# Patient Record
Sex: FEMALE | Race: WHITE | NOT HISPANIC OR LATINO | URBAN - NONMETROPOLITAN AREA
[De-identification: names, ages, dates, MRNs, and addresses within clinical notes are randomized per-mention and may not be internally consistent; named-entity substitution may affect disease eponyms.]

---

## 2021-10-21 PROBLEM — I10 ESSENTIAL HYPERTENSION: Status: ACTIVE | Noted: 2021-10-21

## 2021-10-21 PROBLEM — R53.83 FATIGUE: Status: ACTIVE | Noted: 2021-10-21

## 2021-10-21 PROBLEM — R07.9 CHEST PAIN: Status: ACTIVE | Noted: 2021-10-21

## 2021-10-21 PROBLEM — R06.02 SOB (SHORTNESS OF BREATH): Status: ACTIVE | Noted: 2021-10-21

## 2021-10-21 NOTE — PROGRESS NOTES
OFFICE VISIT  NOTE  [facility] CARDIOLOGY      Name: Sharda Bowen    Date: 10/25/2021  MRN:  [id number]  :  1955      REFERRING/PRIMARY PROVIDER:  Eduardo Angelo MD    Chief Complaint   Patient presents with   • Chest Pain   • Dizziness   • Shortness of Breath       HPI: Sharda Bowen is a 66 y.o. female who presents today for new consultation for chest pressure, shortness of breath, and fatigue.  Reports chest pain, substernal, pressure-like, associated shortness of breath, last 5 to 10 minutes, worse with exertion, gets better with rest.  Had an episode when walking into Dr. Angelo's office, felt dizzy lightheaded presyncopal.  Had dyspnea on exertion when taking a shower.  Increasing fatigue and decreased exercise tolerance over the last 6 months.  Has a torn hamstring, cannot exercise.    Past Medical History:   Diagnosis Date   • Anemia    • Arthritis    • Hyperlipidemia    • Hypertension    • Perforated bowel (HCC)    • Rupture of gluteus minimus tendon        Past Surgical History:   Procedure Laterality Date   • ABDOMINAL AORTIC ANEURYSM REPAIR      Splenic   • BACK SURGERY     • CHOLECYSTECTOMY  2012   • HERNIA REPAIR  2013   • HYSTERECTOMY         Social History     Socioeconomic History   • Marital status:    Tobacco Use   • Smoking status: Never Smoker   • Smokeless tobacco: Never Used   Substance and Sexual Activity   • Alcohol use: Never   • Drug use: Never   • Sexual activity: Defer       Family History   Problem Relation Age of Onset   • Heart attack Mother         ROS:   Constitutional no fever,  no weight loss   Skin no rash, no subcutaneous nodules   Otolaryngeal no difficulty swallowing   Cardiovascular See HPI   Pulmonary no cough, no sputum production   Gastrointestinal no constipation, no diarrhea   Genitourinary no dysuria, no hematuria   Hematologic no easy bruisability, no abnormal bleeding   Musculoskeletal no muscle pain   Neurologic  "no dizziness, no falls         Allergies   Allergen Reactions   • Dexamethasone Other (See Comments)     Affected vision, fluid behind retinas per pt   • Ciprofloxacin Other (See Comments)     muscle aching, nausea, diarrhea  Several different reactions      • Metronidazole Other (See Comments)     muschle aching, nausea, diarrhea  Several different reactions           Current Outpatient Medications:   •  atorvastatin (LIPITOR) 20 MG tablet, Take 20 mg by mouth Daily., Disp: , Rfl:   •  estradiol (VIVELLE-DOT) 0.1 MG/24HR patch, Place 1 patch on the skin as directed by provider 2 (Two) Times a Week., Disp: , Rfl:   •  fexofenadine (ALLEGRA) 180 MG tablet, Take 180 mg by mouth Daily., Disp: , Rfl:   •  IPRATROPIUM BROMIDE HFA IN, Inhale 1 puff 3 (Three) Times a Day., Disp: , Rfl:   •  lisinopril (PRINIVIL,ZESTRIL) 10 MG tablet, Take 10 mg by mouth Daily., Disp: , Rfl:   •  meloxicam (MOBIC) 7.5 MG tablet, Take 7.5 mg by mouth Daily., Disp: , Rfl:   •  Multiple Vitamins-Minerals (PRESERVISION AREDS 2 PO), Take 1 capsule by mouth Daily., Disp: , Rfl:   •  multivitamin with minerals (CENTRUM ADULTS PO), Take 1 tablet by mouth Daily., Disp: , Rfl:     Vitals:    10/25/21 1103   BP: 142/82   BP Location: Right arm   Patient Position: Sitting   Pulse: 61   SpO2: 97%   Weight: 88.5 kg (195 lb)   Height: 180.3 cm (71\")     Body mass index is 27.2 kg/m².    PHYSICAL EXAM:    General Appearance:   · well developed  · well nourished  HENT:   · oropharynx moist  · lips not cyanotic  Neck:  · thyroid not enlarged  · supple  Respiratory:  · no respiratory distress  · normal breath sounds  · no rales  Cardiovascular:  · no jugular venous distention  · regular rhythm  · apical impulse normal  · S1 normal, S2 normal  · no S3, no S4   · no murmur  · no rub, no thrill  · carotid pulses normal; no bruit  · pedal pulses normal  · lower extremity edema: none    Gastrointestinal:   · bowel sounds normal  · non-tender  · no hepatomegaly, no " splenomegaly  Musculoskeletal:  · no clubbing of fingers.   · normocephalic, head atraumatic  Skin:   · warm, dry  Psychiatric:  · judgement and insight appropriate  · normal mood and affect    RESULTS:     ECG 12 Lead    Date/Time: 10/25/2021 11:20 AM  Performed by: Ramez Randolph MD  Authorized by: Ramez Randolph MD   Comparison: not compared with previous ECG   Previous ECG: no previous ECG available  Rhythm: sinus rhythm  Rate: normal  QRS axis: normal  Other findings: poor R wave progression    Clinical impression: abnormal EKG  Comments: Poor R wave progression.                  Labs:  No results found for: CHOL, TRIG, HDL, LDL, AST, ALT  No results found for: HGBA1C  No components found for: CREATINININE  No results found for: EGFRIFNONA    Most recent PCP note, imaging tests, and labs reviewed.    ASSESSMENT:  Problem List Items Addressed This Visit        Cardiac and Vasculature    Chest pain    Essential hypertension    Relevant Medications    lisinopril (PRINIVIL,ZESTRIL) 10 MG tablet       Pulmonary and Pneumonias    SOB (shortness of breath) - Primary       Symptoms and Signs    Fatigue          PLAN:    1.  Chest pain and shortness of breath:  Concern for angina, proceed with Lexiscan nuclear stress test, patient cannot exercise on a treadmill due to a torn hamstring.  Check echocardiogram to rule out structural heart disease.  The patient was advised to call 911 if chest pain worsens or persist despite rest.    2.  Hypertension:  Long-term goal blood pressure less than 130/80:  She states blood pressures been well controlled recently  Low-sodium diet recommended.  Continue lisinopril    3.  Hyperlipidemia:  Well-controlled on atorvastatin  Continue for now.    Return to clinic in 3 months, or sooner as needed.    Thank you for the opportunity to share in the care of your patient; please do not hesitate to call me with any questions.     Ramez Randolph MD, [facility]  Office: [phone]  [address] [address]    10/25/21

## 2021-10-25 ENCOUNTER — OFFICE VISIT (OUTPATIENT)
Dept: CARDIOLOGY | Facility: CLINIC | Age: 66
End: 2021-10-25

## 2021-10-25 VITALS
BODY MASS INDEX: 27.3 KG/M2 | HEIGHT: 71 IN | OXYGEN SATURATION: 97 % | WEIGHT: 195 LBS | HEART RATE: 61 BPM | SYSTOLIC BLOOD PRESSURE: 142 MMHG | DIASTOLIC BLOOD PRESSURE: 82 MMHG

## 2021-10-25 DIAGNOSIS — R53.83 OTHER FATIGUE: ICD-10-CM

## 2021-10-25 DIAGNOSIS — I10 ESSENTIAL HYPERTENSION: ICD-10-CM

## 2021-10-25 DIAGNOSIS — R07.9 CHEST PAIN, UNSPECIFIED TYPE: ICD-10-CM

## 2021-10-25 DIAGNOSIS — R06.02 SOB (SHORTNESS OF BREATH): Primary | ICD-10-CM

## 2021-10-25 PROCEDURE — 93000 ELECTROCARDIOGRAM COMPLETE: CPT | Performed by: INTERNAL MEDICINE

## 2021-10-25 PROCEDURE — 99204 OFFICE O/P NEW MOD 45 MIN: CPT | Performed by: INTERNAL MEDICINE

## 2021-10-25 RX ORDER — MULTIPLE VITAMINS W/ MINERALS TAB 9MG-400MCG
1 TAB ORAL DAILY
COMMUNITY

## 2021-10-25 RX ORDER — ESTRADIOL 0.1 MG/D
1 FILM, EXTENDED RELEASE TRANSDERMAL 2 TIMES WEEKLY
COMMUNITY
End: 2022-08-17 | Stop reason: SDUPTHER

## 2021-10-25 RX ORDER — ATORVASTATIN CALCIUM 20 MG/1
20 TABLET, FILM COATED ORAL DAILY
COMMUNITY
End: 2022-08-17 | Stop reason: SDUPTHER

## 2021-10-25 RX ORDER — MELOXICAM 7.5 MG/1
7.5 TABLET ORAL DAILY
COMMUNITY
End: 2022-08-17 | Stop reason: SDUPTHER

## 2021-10-25 RX ORDER — FEXOFENADINE HCL 180 MG/1
180 TABLET ORAL DAILY
COMMUNITY
End: 2022-08-17 | Stop reason: ALTCHOICE

## 2021-10-25 RX ORDER — LISINOPRIL 10 MG/1
10 TABLET ORAL DAILY
COMMUNITY
End: 2022-08-17 | Stop reason: SDUPTHER

## 2021-11-15 ENCOUNTER — TELEPHONE (OUTPATIENT)
Dept: CARDIOLOGY | Facility: CLINIC | Age: 66
End: 2021-11-15

## 2021-11-15 DIAGNOSIS — R07.9 CHEST PAIN, UNSPECIFIED TYPE: ICD-10-CM

## 2021-11-15 DIAGNOSIS — R53.83 OTHER FATIGUE: ICD-10-CM

## 2021-11-15 DIAGNOSIS — R06.02 SOB (SHORTNESS OF BREATH): ICD-10-CM

## 2021-11-15 DIAGNOSIS — R94.39 ABNORMAL STRESS TEST: Primary | ICD-10-CM

## 2021-11-15 NOTE — TELEPHONE ENCOUNTER
----- Message from Ramez Randolph MD sent at 11/15/2021  3:52 PM EST -----  I discussed the findings with the patient, please schedule cardiac catheterization.

## 2021-11-16 PROBLEM — R94.39 ABNORMAL STRESS TEST: Status: ACTIVE | Noted: 2021-11-16

## 2021-11-17 ENCOUNTER — PREP FOR SURGERY (OUTPATIENT)
Dept: OTHER | Facility: HOSPITAL | Age: 66
End: 2021-11-17

## 2021-11-17 RX ORDER — ASPIRIN 81 MG/1
81 TABLET ORAL DAILY
Status: CANCELLED | OUTPATIENT
Start: 2021-11-18

## 2021-11-17 RX ORDER — ASPIRIN 81 MG/1
324 TABLET, CHEWABLE ORAL ONCE
Status: CANCELLED | OUTPATIENT
Start: 2021-11-17 | End: 2021-11-17

## 2021-11-17 RX ORDER — ACETAMINOPHEN 325 MG/1
650 TABLET ORAL EVERY 4 HOURS PRN
Status: CANCELLED | OUTPATIENT
Start: 2021-11-17

## 2021-11-17 RX ORDER — NITROGLYCERIN 0.4 MG/1
0.4 TABLET SUBLINGUAL
Status: CANCELLED | OUTPATIENT
Start: 2021-11-17

## 2021-11-17 RX ORDER — SODIUM CHLORIDE 0.9 % (FLUSH) 0.9 %
10 SYRINGE (ML) INJECTION AS NEEDED
Status: CANCELLED | OUTPATIENT
Start: 2021-11-17

## 2021-11-17 RX ORDER — SODIUM CHLORIDE 0.9 % (FLUSH) 0.9 %
3 SYRINGE (ML) INJECTION EVERY 12 HOURS SCHEDULED
Status: CANCELLED | OUTPATIENT
Start: 2021-11-17

## 2021-11-24 ENCOUNTER — HOSPITAL ENCOUNTER (OUTPATIENT)
Facility: HOSPITAL | Age: 66
Setting detail: HOSPITAL OUTPATIENT SURGERY
Discharge: HOME | End: 2021-11-24
Attending: INTERNAL MEDICINE | Admitting: INTERNAL MEDICINE

## 2021-11-24 VITALS
HEART RATE: 56 BPM | DIASTOLIC BLOOD PRESSURE: 74 MMHG | SYSTOLIC BLOOD PRESSURE: 108 MMHG | OXYGEN SATURATION: 98 % | WEIGHT: 196.6 LBS | RESPIRATION RATE: 18 BRPM | HEIGHT: 71 IN | BODY MASS INDEX: 27.52 KG/M2 | TEMPERATURE: 97.8 F

## 2021-11-24 DIAGNOSIS — R07.9 CHEST PAIN, UNSPECIFIED TYPE: ICD-10-CM

## 2021-11-24 DIAGNOSIS — R94.39 ABNORMAL STRESS TEST: ICD-10-CM

## 2021-11-24 DIAGNOSIS — R53.83 OTHER FATIGUE: ICD-10-CM

## 2021-11-24 DIAGNOSIS — R06.02 SOB (SHORTNESS OF BREATH): ICD-10-CM

## 2021-11-24 LAB
ALBUMIN SERPL-MCNC: 4.4 G/DL (ref 3.5–5.2)
ALBUMIN/GLOB SERPL: 1.9 G/DL
ALP SERPL-CCNC: 61 U/L (ref 39–117)
ALT SERPL W P-5'-P-CCNC: 11 U/L (ref 1–33)
ANION GAP SERPL CALCULATED.3IONS-SCNC: 8 MMOL/L (ref 5–15)
AST SERPL-CCNC: 19 U/L (ref 1–32)
BILIRUB SERPL-MCNC: 0.4 MG/DL (ref 0–1.2)
BUN SERPL-MCNC: 18 MG/DL (ref 8–23)
BUN/CREAT SERPL: 18.9 (ref 7–25)
CALCIUM SPEC-SCNC: 8.8 MG/DL (ref 8.6–10.5)
CHLORIDE SERPL-SCNC: 105 MMOL/L (ref 98–107)
CHOLEST SERPL-MCNC: 148 MG/DL (ref 0–200)
CO2 SERPL-SCNC: 26 MMOL/L (ref 22–29)
CREAT BLDA-MCNC: 1 MG/DL (ref 0.6–1.3)
CREAT SERPL-MCNC: 0.95 MG/DL (ref 0.57–1)
DEPRECATED RDW RBC AUTO: 45.3 FL (ref 37–54)
ERYTHROCYTE [DISTWIDTH] IN BLOOD BY AUTOMATED COUNT: 13.6 % (ref 12.3–15.4)
GFR SERPL CREATININE-BSD FRML MDRD: 59 ML/MIN/1.73
GLOBULIN UR ELPH-MCNC: 2.3 GM/DL
GLUCOSE SERPL-MCNC: 92 MG/DL (ref 65–99)
HCT VFR BLD AUTO: 40.8 % (ref 34–46.6)
HDLC SERPL-MCNC: 56 MG/DL (ref 40–60)
HGB BLD-MCNC: 13.4 G/DL (ref 12–15.9)
LDLC SERPL CALC-MCNC: 66 MG/DL (ref 0–100)
LDLC/HDLC SERPL: 1.09 {RATIO}
MCH RBC QN AUTO: 29.6 PG (ref 26.6–33)
MCHC RBC AUTO-ENTMCNC: 32.8 G/DL (ref 31.5–35.7)
MCV RBC AUTO: 90.1 FL (ref 79–97)
PLATELET # BLD AUTO: 214 10*3/MM3 (ref 140–450)
PMV BLD AUTO: 9.4 FL (ref 6–12)
POTASSIUM SERPL-SCNC: 3.8 MMOL/L (ref 3.5–5.2)
PROT SERPL-MCNC: 6.7 G/DL (ref 6–8.5)
RBC # BLD AUTO: 4.53 10*6/MM3 (ref 3.77–5.28)
SODIUM SERPL-SCNC: 139 MMOL/L (ref 136–145)
TRIGL SERPL-MCNC: 155 MG/DL (ref 0–150)
VLDLC SERPL-MCNC: 26 MG/DL (ref 5–40)
WBC NRBC COR # BLD: 6.26 10*3/MM3 (ref 3.4–10.8)

## 2021-11-24 PROCEDURE — 93458 L HRT ARTERY/VENTRICLE ANGIO: CPT | Performed by: INTERNAL MEDICINE

## 2021-11-24 PROCEDURE — 80061 LIPID PANEL: CPT | Performed by: PHYSICIAN ASSISTANT

## 2021-11-24 PROCEDURE — 25010000002 MIDAZOLAM PER 1 MG: Performed by: INTERNAL MEDICINE

## 2021-11-24 PROCEDURE — 82565 ASSAY OF CREATININE: CPT

## 2021-11-24 PROCEDURE — 85027 COMPLETE CBC AUTOMATED: CPT | Performed by: PHYSICIAN ASSISTANT

## 2021-11-24 PROCEDURE — 0 IOPAMIDOL PER 1 ML: Performed by: INTERNAL MEDICINE

## 2021-11-24 PROCEDURE — 80053 COMPREHEN METABOLIC PANEL: CPT | Performed by: PHYSICIAN ASSISTANT

## 2021-11-24 PROCEDURE — C1769 GUIDE WIRE: HCPCS | Performed by: INTERNAL MEDICINE

## 2021-11-24 PROCEDURE — 25010000002 FENTANYL CITRATE (PF) 50 MCG/ML SOLUTION: Performed by: INTERNAL MEDICINE

## 2021-11-24 PROCEDURE — C1894 INTRO/SHEATH, NON-LASER: HCPCS | Performed by: INTERNAL MEDICINE

## 2021-11-24 PROCEDURE — 25010000002 HEPARIN (PORCINE) PER 1000 UNITS: Performed by: INTERNAL MEDICINE

## 2021-11-24 RX ORDER — LIDOCAINE HYDROCHLORIDE 10 MG/ML
INJECTION, SOLUTION EPIDURAL; INFILTRATION; INTRACAUDAL; PERINEURAL AS NEEDED
Status: DISCONTINUED | OUTPATIENT
Start: 2021-11-24 | End: 2021-11-24 | Stop reason: HOSPADM

## 2021-11-24 RX ORDER — SODIUM CHLORIDE 9 MG/ML
100 INJECTION, SOLUTION INTRAVENOUS CONTINUOUS
Status: DISCONTINUED | OUTPATIENT
Start: 2021-11-24 | End: 2021-11-24 | Stop reason: HOSPADM

## 2021-11-24 RX ORDER — MIDAZOLAM HYDROCHLORIDE 1 MG/ML
INJECTION INTRAMUSCULAR; INTRAVENOUS AS NEEDED
Status: DISCONTINUED | OUTPATIENT
Start: 2021-11-24 | End: 2021-11-24 | Stop reason: HOSPADM

## 2021-11-24 RX ORDER — ACETAMINOPHEN 325 MG/1
650 TABLET ORAL EVERY 4 HOURS PRN
Status: DISCONTINUED | OUTPATIENT
Start: 2021-11-24 | End: 2021-11-24 | Stop reason: HOSPADM

## 2021-11-24 RX ORDER — NITROGLYCERIN 0.4 MG/1
0.4 TABLET SUBLINGUAL
Status: DISCONTINUED | OUTPATIENT
Start: 2021-11-24 | End: 2021-11-24 | Stop reason: HOSPADM

## 2021-11-24 RX ORDER — SODIUM CHLORIDE 0.9 % (FLUSH) 0.9 %
3 SYRINGE (ML) INJECTION EVERY 12 HOURS SCHEDULED
Status: DISCONTINUED | OUTPATIENT
Start: 2021-11-24 | End: 2021-11-24 | Stop reason: HOSPADM

## 2021-11-24 RX ORDER — SODIUM CHLORIDE 9 MG/ML
3 INJECTION, SOLUTION INTRAVENOUS CONTINUOUS
Status: DISCONTINUED | OUTPATIENT
Start: 2021-11-24 | End: 2021-11-24 | Stop reason: HOSPADM

## 2021-11-24 RX ORDER — ASPIRIN 81 MG/1
324 TABLET, CHEWABLE ORAL ONCE
Status: COMPLETED | OUTPATIENT
Start: 2021-11-24 | End: 2021-11-24

## 2021-11-24 RX ORDER — SODIUM CHLORIDE 0.9 % (FLUSH) 0.9 %
10 SYRINGE (ML) INJECTION AS NEEDED
Status: DISCONTINUED | OUTPATIENT
Start: 2021-11-24 | End: 2021-11-24 | Stop reason: HOSPADM

## 2021-11-24 RX ORDER — ASPIRIN 81 MG/1
81 TABLET ORAL DAILY
Status: DISCONTINUED | OUTPATIENT
Start: 2021-11-25 | End: 2021-11-24 | Stop reason: HOSPADM

## 2021-11-24 RX ORDER — FENTANYL CITRATE 50 UG/ML
INJECTION, SOLUTION INTRAMUSCULAR; INTRAVENOUS AS NEEDED
Status: DISCONTINUED | OUTPATIENT
Start: 2021-11-24 | End: 2021-11-24 | Stop reason: HOSPADM

## 2021-11-24 RX ADMIN — ASPIRIN 324 MG: 81 TABLET, CHEWABLE ORAL at 08:17

## 2021-11-24 RX ADMIN — SODIUM CHLORIDE 3 ML/KG/HR: 9 INJECTION, SOLUTION INTRAVENOUS at 08:16

## 2021-11-24 NOTE — INTERVAL H&P NOTE
H&P reviewed. The patient was examined and there are no changes to the H&P.      The risks, benefits, and alternative options of cardiac catheterization were discussed with the patient.  The risks include death, MI, stroke, infection, vascular injury requiring surgical repair and/or blood transfusion, coronary dissection, renal dysfunction/failure, allergic reaction, emergent CABG.  If PCI is needed there is a 1-2% risk of emergent CABG.  The patient is agreeable for cardiac catheterization, possible PCI or CABG. Plan is to proceed with cardiac catheterization and possible PCI.     Ramez Randolph M.D., F.A.C.C.  Interventional Cardiology  11/24/21  08:58 EST

## 2022-04-20 RX ORDER — NORTRIPTYLINE HYDROCHLORIDE 25 MG/1
CAPSULE ORAL
Qty: 30 CAPSULE | Refills: 0 | Status: SHIPPED | OUTPATIENT
Start: 2022-04-20 | End: 2022-08-17 | Stop reason: SDUPTHER

## 2022-08-08 RX ORDER — NORTRIPTYLINE HYDROCHLORIDE 25 MG/1
CAPSULE ORAL
Qty: 90 CAPSULE | Refills: 1 | OUTPATIENT
Start: 2022-08-08

## 2022-08-17 ENCOUNTER — OFFICE VISIT (OUTPATIENT)
Dept: FAMILY MEDICINE CLINIC | Facility: CLINIC | Age: 67
End: 2022-08-17

## 2022-08-17 VITALS
BODY MASS INDEX: 26.6 KG/M2 | WEIGHT: 190 LBS | SYSTOLIC BLOOD PRESSURE: 112 MMHG | HEIGHT: 71 IN | DIASTOLIC BLOOD PRESSURE: 72 MMHG

## 2022-08-17 DIAGNOSIS — M25.561 CHRONIC PAIN OF BOTH KNEES: ICD-10-CM

## 2022-08-17 DIAGNOSIS — Z12.31 SCREENING MAMMOGRAM FOR BREAST CANCER: ICD-10-CM

## 2022-08-17 DIAGNOSIS — G89.29 CHRONIC PAIN OF BOTH KNEES: ICD-10-CM

## 2022-08-17 DIAGNOSIS — E78.2 MIXED HYPERLIPIDEMIA: ICD-10-CM

## 2022-08-17 DIAGNOSIS — K52.9 CHRONIC DIARRHEA: ICD-10-CM

## 2022-08-17 DIAGNOSIS — N95.1 POSTMENOPAUSAL SYNDROME: ICD-10-CM

## 2022-08-17 DIAGNOSIS — M25.562 CHRONIC PAIN OF BOTH KNEES: ICD-10-CM

## 2022-08-17 DIAGNOSIS — I10 ESSENTIAL HYPERTENSION: Primary | ICD-10-CM

## 2022-08-17 PROCEDURE — 1159F MED LIST DOCD IN RCRD: CPT | Performed by: PHYSICIAN ASSISTANT

## 2022-08-17 PROCEDURE — 1170F FXNL STATUS ASSESSED: CPT | Performed by: PHYSICIAN ASSISTANT

## 2022-08-17 PROCEDURE — 36415 COLL VENOUS BLD VENIPUNCTURE: CPT | Performed by: PHYSICIAN ASSISTANT

## 2022-08-17 PROCEDURE — G0402 INITIAL PREVENTIVE EXAM: HCPCS | Performed by: PHYSICIAN ASSISTANT

## 2022-08-17 RX ORDER — LISINOPRIL 10 MG/1
10 TABLET ORAL DAILY
Qty: 90 TABLET | Refills: 1 | Status: SHIPPED | OUTPATIENT
Start: 2022-08-17

## 2022-08-17 RX ORDER — ATORVASTATIN CALCIUM 20 MG/1
20 TABLET, FILM COATED ORAL DAILY
Qty: 90 TABLET | Refills: 1 | Status: SHIPPED | OUTPATIENT
Start: 2022-08-17

## 2022-08-17 RX ORDER — MELOXICAM 7.5 MG/1
7.5 TABLET ORAL DAILY
Qty: 90 TABLET | Refills: 1 | Status: SHIPPED | OUTPATIENT
Start: 2022-08-17

## 2022-08-17 RX ORDER — NORTRIPTYLINE HYDROCHLORIDE 25 MG/1
25 CAPSULE ORAL DAILY
Qty: 90 CAPSULE | Refills: 1 | Status: SHIPPED | OUTPATIENT
Start: 2022-08-17 | End: 2023-01-18

## 2022-08-17 RX ORDER — ESTRADIOL 0.1 MG/D
1 FILM, EXTENDED RELEASE TRANSDERMAL 2 TIMES WEEKLY
Qty: 48 PATCH | Refills: 1 | Status: SHIPPED | OUTPATIENT
Start: 2022-08-18

## 2022-08-17 NOTE — PROGRESS NOTES
The ABCs of the Annual Wellness Visit  Welcome to Medicare Visit    Chief Complaint   Patient presents with   • Welcome To Medicare     Patient is here today for a physical/welcome to medicare    • Hypertension     Patient is needing medication refilled   • Anxiety     Patient is needing medication refilled     Subjective {   History of Present Illness:  Sharda Bowen is a 67 y.o. female who presents for a  Welcome to Medicare Visit.    The following portions of the patient's history were reviewed and   updated as appropriate: allergies, current medications, past family history, past medical history, past social history, past surgical history and problem list.     Compared to one year ago, the patient feels her physical   health is better.    Compared to one year ago, the patient feels her mental   health is the same.    Recent Hospitalizations:  She was not admitted to the hospital during the last year.       Current Medical Providers:  Patient Care Team:  Eduardo Angelo MD as PCP - General (Family Medicine)    Outpatient Medications Prior to Visit   Medication Sig Dispense Refill   • atorvastatin (LIPITOR) 20 MG tablet Take 20 mg by mouth Daily.     • estradiol (VIVELLE-DOT) 0.1 MG/24HR patch Place 1 patch on the skin as directed by provider 2 (Two) Times a Week.     • lisinopril (PRINIVIL,ZESTRIL) 10 MG tablet Take 10 mg by mouth Daily.     • meloxicam (MOBIC) 7.5 MG tablet Take 7.5 mg by mouth Daily.     • Multiple Vitamins-Minerals (PRESERVISION AREDS 2 PO) Take 1 capsule by mouth Daily.     • multivitamin with minerals tablet tablet Take 1 tablet by mouth Daily.     • nortriptyline (PAMELOR) 25 MG capsule Take 1 capsule by mouth once daily 30 capsule 0   • fexofenadine (ALLEGRA) 180 MG tablet Take 180 mg by mouth Daily.     • IPRATROPIUM BROMIDE HFA IN Inhale 1 puff 3 (Three) Times a Day.       No facility-administered medications prior to visit.       No opioid medication identified on active  "medication list. I have reviewed chart for other potential  high risk medication/s and harmful drug interactions in the elderly.          Aspirin is not on active medication list.  Aspirin use is not indicated based on review of current medical condition/s. Risk of harm outweighs potential benefits.  .    Patient Active Problem List   Diagnosis   • Chest pain   • SOB (shortness of breath)   • Fatigue   • Essential hypertension   • Abnormal stress test   • Postmenopausal syndrome   • Chronic diarrhea   • Chronic pain of both knees   • Mixed hyperlipidemia   • Screening mammogram for breast cancer     Advance Care Planning  Advance Directive is not on file.  ACP discussion was held with the patient during this visit. Patient has an advance directive (not in EMR), copy requested.          Objective      Vitals:    08/17/22 0757   BP: 112/72   BP Location: Left arm   Patient Position: Sitting   Cuff Size: Adult   Weight: 86.2 kg (190 lb)   Height: 180.3 cm (71\")     Estimated body mass index is 26.5 kg/m² as calculated from the following:    Height as of this encounter: 180.3 cm (71\").    Weight as of this encounter: 86.2 kg (190 lb).    BMI is >= 25 and <30. (Overweight) The following options were offered after discussion;: exercise counseling/recommendations      Does the patient have evidence of cognitive impairment? No    Physical Exam         Procedures       HEALTH RISK ASSESSMENT    Smoking Status:  Social History     Tobacco Use   Smoking Status Never Smoker   Smokeless Tobacco Never Used     Alcohol Consumption:  Social History     Substance and Sexual Activity   Alcohol Use Never       Fall Risk Screen:    STEADI Fall Risk Assessment was completed, and patient is at LOW risk for falls.Assessment completed on:8/17/2022    Depression Screen:   PHQ-2/PHQ-9 Depression Screening 8/17/2022   Little Interest or Pleasure in Doing Things 0-->not at all   Feeling Down, Depressed or Hopeless 0-->not at all   PHQ-9: Brief " Depression Severity Measure Score 0       Health Habits and Functional and Cognitive Screening:  No flowsheet data found.    Visual Acuity:    No exam data present    Age-appropriate Screening Schedule:  Refer to the list below for future screening recommendations based on patient's age, sex and/or medical conditions. Orders for these recommended tests are listed in the plan section. The patient has been provided with a written plan.    Health Maintenance   Topic Date Due   • DXA SCAN  Never done   • ZOSTER VACCINE (1 of 2) 07/02/2007   • MAMMOGRAM  01/21/2021   • INFLUENZA VACCINE  10/01/2022   • LIPID PANEL  11/24/2022   • TDAP/TD VACCINES (2 - Td or Tdap) 06/05/2028          Assessment & Plan   CMS Preventative Services Quick Reference  Risk Factors Identified During Encounter  Obesity/Overweight   The above risks/problems have been discussed with the patient.  Pertinent information has been shared with the patient in the After Visit Summary.  Follow up plans and orders are seen below in the Assessment/Plan Section.    Diagnoses and all orders for this visit:    1. Essential hypertension (Primary)  -     CBC & Differential; Future  -     Comprehensive Metabolic Panel; Future  -     TSH; Future  -     Lipid Panel; Future    2. Postmenopausal syndrome    3. Chronic diarrhea    4. Chronic pain of both knees    5. Mixed hyperlipidemia    6. Screening mammogram for breast cancer        Follow Up:   No follow-ups on file.     An After Visit Summary and PPPS were made available to the patient.

## 2022-08-17 NOTE — PROGRESS NOTES
The ABCs of the Annual Wellness Visit  Welcome to Medicare Visit    Chief Complaint   Patient presents with   • Welcome To Medicare     Patient is here today for a physical/welcome to medicare    • Hypertension     Patient is needing medication refilled   • Anxiety     Patient is needing medication refilled     Subjective {   History of Present Illness:  Sharda Bowen is a 67 y.o. female who presents for a  Welcome to Medicare Visit.    The following portions of the patient's history were reviewed and   updated as appropriate: allergies, current medications, past family history, past medical history, past social history, past surgical history and problem list.     Compared to one year ago, the patient feels her physical   health is better.    Compared to one year ago, the patient feels her mental   health is the same.    Recent Hospitalizations:  She was not admitted to the hospital during the last year.       Current Medical Providers:  Patient Care Team:  Eduardo Angelo MD as PCP - General (Family Medicine)    Outpatient Medications Prior to Visit   Medication Sig Dispense Refill   • Multiple Vitamins-Minerals (PRESERVISION AREDS 2 PO) Take 1 capsule by mouth Daily.     • multivitamin with minerals tablet tablet Take 1 tablet by mouth Daily.     • atorvastatin (LIPITOR) 20 MG tablet Take 20 mg by mouth Daily.     • estradiol (VIVELLE-DOT) 0.1 MG/24HR patch Place 1 patch on the skin as directed by provider 2 (Two) Times a Week.     • lisinopril (PRINIVIL,ZESTRIL) 10 MG tablet Take 10 mg by mouth Daily.     • meloxicam (MOBIC) 7.5 MG tablet Take 7.5 mg by mouth Daily.     • nortriptyline (PAMELOR) 25 MG capsule Take 1 capsule by mouth once daily 30 capsule 0   • fexofenadine (ALLEGRA) 180 MG tablet Take 180 mg by mouth Daily.     • IPRATROPIUM BROMIDE HFA IN Inhale 1 puff 3 (Three) Times a Day.       No facility-administered medications prior to visit.       No opioid medication identified on active  "medication list. I have reviewed chart for other potential  high risk medication/s and harmful drug interactions in the elderly.          Aspirin is not on active medication list.  Aspirin use is not indicated based on review of current medical condition/s. Risk of harm outweighs potential benefits.  .    Patient Active Problem List   Diagnosis   • Chest pain   • SOB (shortness of breath)   • Fatigue   • Essential hypertension   • Abnormal stress test   • Postmenopausal syndrome   • Chronic diarrhea   • Chronic pain of both knees   • Mixed hyperlipidemia   • Screening mammogram for breast cancer     Advance Care Planning  Advance Directive is not on file.  ACP discussion was held with the patient during this visit. Patient has an advance directive (not in EMR), copy requested.          Objective      Vitals:    08/17/22 0757   BP: 112/72   BP Location: Left arm   Patient Position: Sitting   Cuff Size: Adult   Weight: 86.2 kg (190 lb)   Height: 180.3 cm (71\")     Estimated body mass index is 26.5 kg/m² as calculated from the following:    Height as of this encounter: 180.3 cm (71\").    Weight as of this encounter: 86.2 kg (190 lb).    BMI is >= 25 and <30. (Overweight) The following options were offered after discussion;: exercise counseling/recommendations      Does the patient have evidence of cognitive impairment? No    Physical Exam         Procedures       HEALTH RISK ASSESSMENT    Smoking Status:  Social History     Tobacco Use   Smoking Status Never Smoker   Smokeless Tobacco Never Used     Alcohol Consumption:  Social History     Substance and Sexual Activity   Alcohol Use Never       Fall Risk Screen:    STEADI Fall Risk Assessment was completed, and patient is at LOW risk for falls.Assessment completed on:8/17/2022    Depression Screen:   PHQ-2/PHQ-9 Depression Screening 8/17/2022   Little Interest or Pleasure in Doing Things 0-->not at all   Feeling Down, Depressed or Hopeless 0-->not at all   PHQ-9: Brief " Depression Severity Measure Score 0       Health Habits and Functional and Cognitive Screening:  Functional & Cognitive Status 8/17/2022   Do you have difficulty preparing food and eating? No   Do you have difficulty bathing yourself, getting dressed or grooming yourself? No   Do you have difficulty using the toilet? No   Do you have difficulty moving around from place to place? No   Do you have trouble with steps or getting out of a bed or a chair? No   Current Diet Well Balanced Diet   Dental Exam Up to date   Eye Exam Up to date   Exercise (times per week) 3 times per week   Current Exercises Include Walking   Do you need help using the phone?  No   Are you deaf or do you have serious difficulty hearing?  No   Do you need help with transportation? No   Do you need help shopping? No   Do you need help preparing meals?  No   Do you need help with housework?  No   Do you need help with laundry? No   Do you need help taking your medications? No   Do you need help managing money? No   Do you ever drive or ride in a car without wearing a seat belt? No   Have you felt unusual stress, anger or loneliness in the last month? No   Who do you live with? Spouse   If you need help, do you have trouble finding someone available to you? No   Have you been bothered in the last four weeks by sexual problems? No   Do you have difficulty concentrating, remembering or making decisions? No       Visual Acuity:    No exam data present    Age-appropriate Screening Schedule:  Refer to the list below for future screening recommendations based on patient's age, sex and/or medical conditions. Orders for these recommended tests are listed in the plan section. The patient has been provided with a written plan.    Health Maintenance   Topic Date Due   • DXA SCAN  Never done   • ZOSTER VACCINE (1 of 2) 07/02/2007   • MAMMOGRAM  01/21/2021   • INFLUENZA VACCINE  10/01/2022   • LIPID PANEL  11/24/2022   • TDAP/TD VACCINES (2 - Td or Tdap)  06/05/2028          Assessment & Plan   CMS Preventative Services Quick Reference  Risk Factors Identified During Encounter  Obesity/Overweight   The above risks/problems have been discussed with the patient.  Pertinent information has been shared with the patient in the After Visit Summary.  Follow up plans and orders are seen below in the Assessment/Plan Section.    Diagnoses and all orders for this visit:    1. Essential hypertension (Primary)  Assessment & Plan:  Hypertension is unchanged.  Continue current treatment regimen.  Blood pressure will be reassessed at the next regular appointment.    Orders:  -     CBC & Differential; Future  -     Comprehensive Metabolic Panel; Future  -     TSH; Future  -     Lipid Panel; Future  -     lisinopril (PRINIVIL,ZESTRIL) 10 MG tablet; Take 1 tablet by mouth Daily.  Dispense: 90 tablet; Refill: 1  -     CBC & Differential  -     Comprehensive Metabolic Panel  -     TSH  -     Lipid Panel    2. Postmenopausal syndrome  Assessment & Plan:  Continue current treatment plan with estradiol advised patient no further refills will be given until she has her mammogram she knowledge understanding    Orders:  -     estradiol (VIVELLE-DOT) 0.1 MG/24HR patch; Place 1 patch on the skin as directed by provider 2 (Two) Times a Week.  Dispense: 48 patch; Refill: 1    3. Chronic diarrhea  Assessment & Plan:  Continue current treatment plan patient is up-to-date with colon screening    Orders:  -     nortriptyline (PAMELOR) 25 MG capsule; Take 1 capsule by mouth Daily.  Dispense: 90 capsule; Refill: 1    4. Chronic pain of both knees  Assessment & Plan:  Patient will continue meloxicam    Orders:  -     meloxicam (MOBIC) 7.5 MG tablet; Take 1 tablet by mouth Daily.  Dispense: 90 tablet; Refill: 1    5. Mixed hyperlipidemia  Assessment & Plan:  Lab work performed today    Orders:  -     atorvastatin (LIPITOR) 20 MG tablet; Take 1 tablet by mouth Daily.  Dispense: 90 tablet; Refill: 1    6.  Screening mammogram for breast cancer  Assessment & Plan:  Provided patient with referral for mammogram    Orders:  -     Mammo Screening Digital Tomosynthesis Bilateral With CAD; Future      Follow Up:   No follow-ups on file.     An After Visit Summary and PPPS were made available to the patient.

## 2022-08-17 NOTE — ASSESSMENT & PLAN NOTE
Continue current treatment plan with estradiol advised patient no further refills will be given until she has her mammogram she knowledge understanding

## 2022-08-18 LAB
ALBUMIN SERPL-MCNC: 4.5 G/DL (ref 3.8–4.8)
ALBUMIN/GLOB SERPL: 2 {RATIO} (ref 1.2–2.2)
ALP SERPL-CCNC: 70 IU/L (ref 44–121)
ALT SERPL-CCNC: 13 IU/L (ref 0–32)
AST SERPL-CCNC: 25 IU/L (ref 0–40)
BASOPHILS # BLD AUTO: 0.1 X10E3/UL (ref 0–0.2)
BASOPHILS NFR BLD AUTO: 1 %
BILIRUB SERPL-MCNC: 0.3 MG/DL (ref 0–1.2)
BUN SERPL-MCNC: 22 MG/DL (ref 8–27)
BUN/CREAT SERPL: 24 (ref 12–28)
CALCIUM SERPL-MCNC: 9.2 MG/DL (ref 8.7–10.3)
CHLORIDE SERPL-SCNC: 103 MMOL/L (ref 96–106)
CHOLEST SERPL-MCNC: 173 MG/DL (ref 100–199)
CO2 SERPL-SCNC: 25 MMOL/L (ref 20–29)
CREAT SERPL-MCNC: 0.91 MG/DL (ref 0.57–1)
EGFRCR-CYS SERPLBLD CKD-EPI 2021: 70 ML/MIN/1.73
EOSINOPHIL # BLD AUTO: 0.1 X10E3/UL (ref 0–0.4)
EOSINOPHIL NFR BLD AUTO: 2 %
ERYTHROCYTE [DISTWIDTH] IN BLOOD BY AUTOMATED COUNT: 13.4 % (ref 11.7–15.4)
GLOBULIN SER CALC-MCNC: 2.3 G/DL (ref 1.5–4.5)
GLUCOSE SERPL-MCNC: 87 MG/DL (ref 65–99)
HCT VFR BLD AUTO: 42.5 % (ref 34–46.6)
HDLC SERPL-MCNC: 61 MG/DL
HGB BLD-MCNC: 14.2 G/DL (ref 11.1–15.9)
IMM GRANULOCYTES # BLD AUTO: 0 X10E3/UL (ref 0–0.1)
IMM GRANULOCYTES NFR BLD AUTO: 1 %
LDLC SERPL CALC-MCNC: 78 MG/DL (ref 0–99)
LYMPHOCYTES # BLD AUTO: 2.4 X10E3/UL (ref 0.7–3.1)
LYMPHOCYTES NFR BLD AUTO: 38 %
MCH RBC QN AUTO: 30 PG (ref 26.6–33)
MCHC RBC AUTO-ENTMCNC: 33.4 G/DL (ref 31.5–35.7)
MCV RBC AUTO: 90 FL (ref 79–97)
MONOCYTES # BLD AUTO: 0.4 X10E3/UL (ref 0.1–0.9)
MONOCYTES NFR BLD AUTO: 7 %
NEUTROPHILS # BLD AUTO: 3.4 X10E3/UL (ref 1.4–7)
NEUTROPHILS NFR BLD AUTO: 51 %
PLATELET # BLD AUTO: 230 X10E3/UL (ref 150–450)
POTASSIUM SERPL-SCNC: 4.7 MMOL/L (ref 3.5–5.2)
PROT SERPL-MCNC: 6.8 G/DL (ref 6–8.5)
RBC # BLD AUTO: 4.73 X10E6/UL (ref 3.77–5.28)
SODIUM SERPL-SCNC: 141 MMOL/L (ref 134–144)
TRIGL SERPL-MCNC: 204 MG/DL (ref 0–149)
TSH SERPL DL<=0.005 MIU/L-ACNC: 2.18 UIU/ML (ref 0.45–4.5)
VLDLC SERPL CALC-MCNC: 34 MG/DL (ref 5–40)
WBC # BLD AUTO: 6.4 X10E3/UL (ref 3.4–10.8)

## 2022-09-08 DIAGNOSIS — I10 ESSENTIAL HYPERTENSION: ICD-10-CM

## 2022-09-09 RX ORDER — LISINOPRIL 10 MG/1
TABLET ORAL
Qty: 90 TABLET | Refills: 0 | OUTPATIENT
Start: 2022-09-09

## 2022-12-07 DIAGNOSIS — I10 ESSENTIAL HYPERTENSION: ICD-10-CM

## 2022-12-07 RX ORDER — LISINOPRIL 10 MG/1
TABLET ORAL
Qty: 90 TABLET | Refills: 0 | OUTPATIENT
Start: 2022-12-07

## 2023-01-06 RX ORDER — MELOXICAM 15 MG/1
TABLET ORAL
Qty: 30 TABLET | Refills: 0 | Status: SHIPPED | OUTPATIENT
Start: 2023-01-06 | End: 2023-02-10

## 2023-01-18 DIAGNOSIS — K52.9 CHRONIC DIARRHEA: ICD-10-CM

## 2023-01-18 RX ORDER — NORTRIPTYLINE HYDROCHLORIDE 25 MG/1
CAPSULE ORAL
Qty: 30 CAPSULE | Refills: 0 | Status: SHIPPED | OUTPATIENT
Start: 2023-01-18 | End: 2023-03-03

## 2023-02-10 RX ORDER — MELOXICAM 15 MG/1
TABLET ORAL
Qty: 30 TABLET | Refills: 0 | Status: SHIPPED | OUTPATIENT
Start: 2023-02-10

## 2023-03-03 DIAGNOSIS — K52.9 CHRONIC DIARRHEA: ICD-10-CM

## 2023-03-03 RX ORDER — NORTRIPTYLINE HYDROCHLORIDE 25 MG/1
CAPSULE ORAL
Qty: 30 CAPSULE | Refills: 0 | Status: SHIPPED | OUTPATIENT
Start: 2023-03-03 | End: 2023-04-03

## 2023-04-02 DIAGNOSIS — K52.9 CHRONIC DIARRHEA: ICD-10-CM

## 2023-04-03 RX ORDER — NORTRIPTYLINE HYDROCHLORIDE 25 MG/1
CAPSULE ORAL
Qty: 30 CAPSULE | Refills: 0 | Status: SHIPPED | OUTPATIENT
Start: 2023-04-03

## 2023-04-24 ENCOUNTER — TELEPHONE (OUTPATIENT)
Dept: FAMILY MEDICINE CLINIC | Facility: CLINIC | Age: 68
End: 2023-04-24

## 2023-04-24 NOTE — TELEPHONE ENCOUNTER
Caller: Sharda Bowen    Relationship: Self    Best call back number: [phone]    Requested Prescriptions:   Requested Prescriptions      No prescriptions requested or ordered in this encounter      lisinopril (PRINIVIL,ZESTRIL) 10 MG tablet    Pharmacy where request should be sent:      Last office visit with prescribing clinician: Visit date not found   Last telemedicine visit with prescribing clinician: 4/25/2023   Next office visit with prescribing clinician: Visit date not found     Additional details provided by patient:     PLEASE CALL IN ENOUGH TO [facility] FOR TWO WEEKS BECAUSE IT WILL TAKE THAT LONG TO RECEIVE FROM [facility].  THEN CALL IN SOME TO [facility]   PATIENT DOES NOT HAVE A PILL FOR TODAYS DOSE    Does the patient have less than a 3 day supply:    [x] Yes  [] No    Would you like a call back once the refill request has been completed: [] Yes [x] No    If the office needs to give you a call back, can they leave a voicemail: [] Yes [x] No    Mandy Stevens, PCT   04/24/23 08:44 EDT

## 2023-04-25 ENCOUNTER — OFFICE VISIT (OUTPATIENT)
Dept: FAMILY MEDICINE CLINIC | Facility: CLINIC | Age: 68
End: 2023-04-25
Payer: MEDICARE

## 2023-04-25 VITALS
HEIGHT: 71 IN | DIASTOLIC BLOOD PRESSURE: 84 MMHG | HEART RATE: 50 BPM | WEIGHT: 184 LBS | SYSTOLIC BLOOD PRESSURE: 140 MMHG | OXYGEN SATURATION: 95 % | BODY MASS INDEX: 25.76 KG/M2

## 2023-04-25 DIAGNOSIS — M25.561 CHRONIC PAIN OF BOTH KNEES: ICD-10-CM

## 2023-04-25 DIAGNOSIS — I10 ESSENTIAL HYPERTENSION: ICD-10-CM

## 2023-04-25 DIAGNOSIS — G89.29 CHRONIC PAIN OF BOTH KNEES: ICD-10-CM

## 2023-04-25 DIAGNOSIS — E78.2 MIXED HYPERLIPIDEMIA: Primary | ICD-10-CM

## 2023-04-25 DIAGNOSIS — M25.562 CHRONIC PAIN OF BOTH KNEES: ICD-10-CM

## 2023-04-25 DIAGNOSIS — K52.9 CHRONIC DIARRHEA: ICD-10-CM

## 2023-04-25 DIAGNOSIS — N95.1 POSTMENOPAUSAL SYNDROME: ICD-10-CM

## 2023-04-25 DIAGNOSIS — Z23 ENCOUNTER FOR IMMUNIZATION: ICD-10-CM

## 2023-04-25 RX ORDER — LISINOPRIL 10 MG/1
10 TABLET ORAL DAILY
Qty: 90 TABLET | Refills: 1 | Status: SHIPPED | OUTPATIENT
Start: 2023-04-25 | End: 2023-04-25 | Stop reason: SDUPTHER

## 2023-04-25 RX ORDER — MELOXICAM 15 MG/1
15 TABLET ORAL DAILY
Qty: 90 TABLET | Refills: 1 | Status: SHIPPED | OUTPATIENT
Start: 2023-04-25

## 2023-04-25 RX ORDER — ESTRADIOL 0.1 MG/D
1 FILM, EXTENDED RELEASE TRANSDERMAL 2 TIMES WEEKLY
Qty: 48 PATCH | Refills: 1 | Status: SHIPPED | OUTPATIENT
Start: 2023-04-27

## 2023-04-25 RX ORDER — LIFITEGRAST 50 MG/ML
1 SOLUTION/ DROPS OPHTHALMIC 2 TIMES DAILY
COMMUNITY
Start: 2023-04-21

## 2023-04-25 RX ORDER — LISINOPRIL 10 MG/1
10 TABLET ORAL DAILY
Qty: 30 TABLET | Refills: 0 | Status: SHIPPED | OUTPATIENT
Start: 2023-04-25

## 2023-04-25 RX ORDER — NORTRIPTYLINE HYDROCHLORIDE 25 MG/1
25 CAPSULE ORAL DAILY
Qty: 90 CAPSULE | Refills: 1 | Status: SHIPPED | OUTPATIENT
Start: 2023-04-25

## 2023-04-25 NOTE — ASSESSMENT & PLAN NOTE
We will increase meloxicam from 7.5 to 15 mg.  Discussed with patient importance of increasing movement to help with arthritis

## 2023-04-25 NOTE — ASSESSMENT & PLAN NOTE
Patient blood pressure is elevated today however she has been without her medication.  Patient is medication refill today advised her to monitor and return to clinic should it remain elevated.  Patient knowledge understanding

## 2023-04-25 NOTE — PROGRESS NOTES
"Chief Complaint  Med Refill    Subjective        Sharda Bowen presents to [facility]  History of Present Illness  Patient reports today for medication refills.  Patient also states she is due for fasting blood work today.    Patient reports having high blood pressure states she has been out of her medication for the past couple of days and requests refills along with some sent to her local pharmacy.    Patient reports she has high cholesterol however discontinued her medication around 2 months ago secondary to feeling like it was causing muscle pain along with fatigue.  Patient reports feeling that her symptoms have improved.  Patient would like to get blood work today and if her cholesterol is still elevated she wants to switch from atorvastatin.    Patient reports having chronic knee pain and takes meloxicam daily.  She reports that the medication does help but she continues to have pain would like to have it increased if possible.      Objective   Vital Signs:  /84 (BP Location: Left arm, Patient Position: Sitting, Cuff Size: Adult)   Pulse 50   Ht 180.3 cm (71\")   Wt 83.5 kg (184 lb)   SpO2 95%   BMI 25.66 kg/m²   Estimated body mass index is 25.66 kg/m² as calculated from the following:    Height as of this encounter: 180.3 cm (71\").    Weight as of this encounter: 83.5 kg (184 lb).             Physical Exam  Vitals and nursing note reviewed.   Constitutional:       General: She is not in acute distress.     Appearance: Normal appearance.   HENT:      Head: Normocephalic.      Right Ear: Hearing normal.      Left Ear: Hearing normal.   Eyes:      Pupils: Pupils are equal, round, and reactive to light.   Cardiovascular:      Rate and Rhythm: Normal rate and regular rhythm.   Pulmonary:      Effort: Pulmonary effort is normal. No respiratory distress.   Skin:     General: Skin is warm and dry.   Neurological:      Mental Status: She is alert.   Psychiatric:         Mood " and Affect: Mood normal.        Result Review :                   Assessment and Plan   Diagnoses and all orders for this visit:    1. Mixed hyperlipidemia (Primary)  Assessment & Plan:  We will get fasting lipid this date advised patient if cholesterol is elevated we will change from atorvastatin to a different medication for possible tolerance.      2. Essential hypertension  Assessment & Plan:  Patient blood pressure is elevated today however she has been without her medication.  Patient is medication refill today advised her to monitor and return to clinic should it remain elevated.  Patient knowledge understanding    Orders:  -     Discontinue: lisinopril (PRINIVIL,ZESTRIL) 10 MG tablet; Take 1 tablet by mouth Daily.  Dispense: 90 tablet; Refill: 1  -     lisinopril (PRINIVIL,ZESTRIL) 10 MG tablet; Take 1 tablet by mouth Daily.  Dispense: 30 tablet; Refill: 0  -     CBC & Differential; Future  -     Comprehensive Metabolic Panel; Future  -     TSH; Future  -     Lipid Panel; Future    3. Chronic diarrhea  Assessment & Plan:  Continue current treatment plan    Orders:  -     nortriptyline (PAMELOR) 25 MG capsule; Take 1 capsule by mouth Daily.  Dispense: 90 capsule; Refill: 1    4. Postmenopausal syndrome  Assessment & Plan:  Patient will continue current treatment plan    Orders:  -     estradiol (VIVELLE-DOT) 0.1 MG/24HR patch; Place 1 patch on the skin as directed by provider 2 (Two) Times a Week.  Dispense: 48 patch; Refill: 1    5. Chronic pain of both knees  Assessment & Plan:  We will increase meloxicam from 7.5 to 15 mg.  Discussed with patient importance of increasing movement to help with arthritis    Orders:  -     meloxicam (MOBIC) 15 MG tablet; Take 1 tablet by mouth Daily. For joint pain  Dispense: 90 tablet; Refill: 1    6. Encounter for immunization  Assessment & Plan:  Patient provided with Pneumovax 20 this date    Orders:  -     Pneumococcal Conjugate Vaccine 20-Valent (PCV20)           Follow  Up   Return in about 6 months (around 10/25/2023).  Patient was given instructions and counseling regarding her condition or for health maintenance advice. Please see specific information pulled into the AVS if appropriate.

## 2023-04-25 NOTE — ASSESSMENT & PLAN NOTE
We will get fasting lipid this date advised patient if cholesterol is elevated we will change from atorvastatin to a different medication for possible tolerance.

## 2023-04-26 LAB
ALBUMIN SERPL-MCNC: 4.2 G/DL (ref 3.8–4.8)
ALBUMIN/GLOB SERPL: 1.8 {RATIO} (ref 1.2–2.2)
ALP SERPL-CCNC: 71 IU/L (ref 44–121)
ALT SERPL-CCNC: 10 IU/L (ref 0–32)
AST SERPL-CCNC: 20 IU/L (ref 0–40)
BASOPHILS # BLD AUTO: 0.1 X10E3/UL (ref 0–0.2)
BASOPHILS NFR BLD AUTO: 1 %
BILIRUB SERPL-MCNC: 0.4 MG/DL (ref 0–1.2)
BUN SERPL-MCNC: 17 MG/DL (ref 8–27)
BUN/CREAT SERPL: 20 (ref 12–28)
CALCIUM SERPL-MCNC: 9 MG/DL (ref 8.7–10.3)
CHLORIDE SERPL-SCNC: 104 MMOL/L (ref 96–106)
CHOLEST SERPL-MCNC: 255 MG/DL (ref 100–199)
CO2 SERPL-SCNC: 25 MMOL/L (ref 20–29)
CREAT SERPL-MCNC: 0.86 MG/DL (ref 0.57–1)
EGFRCR SERPLBLD CKD-EPI 2021: 75 ML/MIN/1.73
EOSINOPHIL # BLD AUTO: 0.1 X10E3/UL (ref 0–0.4)
EOSINOPHIL NFR BLD AUTO: 2 %
ERYTHROCYTE [DISTWIDTH] IN BLOOD BY AUTOMATED COUNT: 13.1 % (ref 11.7–15.4)
GLOBULIN SER CALC-MCNC: 2.4 G/DL (ref 1.5–4.5)
GLUCOSE SERPL-MCNC: 80 MG/DL (ref 70–99)
HCT VFR BLD AUTO: 42.8 % (ref 34–46.6)
HDLC SERPL-MCNC: 65 MG/DL
HGB BLD-MCNC: 13.8 G/DL (ref 11.1–15.9)
IMM GRANULOCYTES # BLD AUTO: 0 X10E3/UL (ref 0–0.1)
IMM GRANULOCYTES NFR BLD AUTO: 1 %
LDLC SERPL CALC-MCNC: 156 MG/DL (ref 0–99)
LYMPHOCYTES # BLD AUTO: 2.2 X10E3/UL (ref 0.7–3.1)
LYMPHOCYTES NFR BLD AUTO: 37 %
MCH RBC QN AUTO: 29 PG (ref 26.6–33)
MCHC RBC AUTO-ENTMCNC: 32.2 G/DL (ref 31.5–35.7)
MCV RBC AUTO: 90 FL (ref 79–97)
MONOCYTES # BLD AUTO: 0.4 X10E3/UL (ref 0.1–0.9)
MONOCYTES NFR BLD AUTO: 6 %
NEUTROPHILS # BLD AUTO: 3.2 X10E3/UL (ref 1.4–7)
NEUTROPHILS NFR BLD AUTO: 53 %
PLATELET # BLD AUTO: 247 X10E3/UL (ref 150–450)
POTASSIUM SERPL-SCNC: 5.1 MMOL/L (ref 3.5–5.2)
PROT SERPL-MCNC: 6.6 G/DL (ref 6–8.5)
RBC # BLD AUTO: 4.76 X10E6/UL (ref 3.77–5.28)
SODIUM SERPL-SCNC: 141 MMOL/L (ref 134–144)
TRIGL SERPL-MCNC: 188 MG/DL (ref 0–149)
TSH SERPL DL<=0.005 MIU/L-ACNC: 1.74 UIU/ML (ref 0.45–4.5)
VLDLC SERPL CALC-MCNC: 34 MG/DL (ref 5–40)
WBC # BLD AUTO: 5.9 X10E3/UL (ref 3.4–10.8)

## 2023-05-01 DIAGNOSIS — E78.2 MIXED HYPERLIPIDEMIA: Primary | ICD-10-CM

## 2023-05-01 RX ORDER — PRAVASTATIN SODIUM 20 MG
20 TABLET ORAL DAILY
Qty: 90 TABLET | Refills: 1 | Status: SHIPPED | OUTPATIENT
Start: 2023-05-01

## 2023-10-25 ENCOUNTER — OFFICE VISIT (OUTPATIENT)
Dept: FAMILY MEDICINE CLINIC | Facility: CLINIC | Age: 68
End: 2023-10-25
Payer: MEDICARE

## 2023-10-25 VITALS
WEIGHT: 181 LBS | TEMPERATURE: 97.6 F | BODY MASS INDEX: 25.34 KG/M2 | OXYGEN SATURATION: 99 % | SYSTOLIC BLOOD PRESSURE: 126 MMHG | HEART RATE: 76 BPM | DIASTOLIC BLOOD PRESSURE: 82 MMHG | HEIGHT: 71 IN

## 2023-10-25 DIAGNOSIS — I10 ESSENTIAL HYPERTENSION: ICD-10-CM

## 2023-10-25 DIAGNOSIS — R53.82 CHRONIC FATIGUE: ICD-10-CM

## 2023-10-25 DIAGNOSIS — Z23 NEEDS FLU SHOT: ICD-10-CM

## 2023-10-25 DIAGNOSIS — Z12.11 SCREENING FOR COLON CANCER: ICD-10-CM

## 2023-10-25 DIAGNOSIS — E55.9 VITAMIN D DEFICIENCY: ICD-10-CM

## 2023-10-25 DIAGNOSIS — K52.9 CHRONIC DIARRHEA: ICD-10-CM

## 2023-10-25 DIAGNOSIS — Z12.31 SCREENING MAMMOGRAM FOR BREAST CANCER: ICD-10-CM

## 2023-10-25 DIAGNOSIS — Z86.010 PERSONAL HISTORY OF COLONIC POLYPS: ICD-10-CM

## 2023-10-25 DIAGNOSIS — Z11.59 NEED FOR HEPATITIS C SCREENING TEST: ICD-10-CM

## 2023-10-25 DIAGNOSIS — Z13.820 ENCOUNTER FOR OSTEOPOROSIS SCREENING IN ASYMPTOMATIC POSTMENOPAUSAL PATIENT: ICD-10-CM

## 2023-10-25 DIAGNOSIS — E78.2 MIXED HYPERLIPIDEMIA: ICD-10-CM

## 2023-10-25 DIAGNOSIS — Z00.00 GENERAL MEDICAL EXAM: Primary | ICD-10-CM

## 2023-10-25 DIAGNOSIS — Z78.0 ENCOUNTER FOR OSTEOPOROSIS SCREENING IN ASYMPTOMATIC POSTMENOPAUSAL PATIENT: ICD-10-CM

## 2023-10-25 DIAGNOSIS — E53.8 VITAMIN B12 DEFICIENCY: ICD-10-CM

## 2023-10-25 DIAGNOSIS — E66.3 OVERWEIGHT (BMI 25.0-29.9): ICD-10-CM

## 2023-10-25 DIAGNOSIS — Z79.899 HIGH RISK MEDICATION USE: ICD-10-CM

## 2023-10-25 DIAGNOSIS — N95.1 POSTMENOPAUSAL SYNDROME: ICD-10-CM

## 2023-10-25 PROBLEM — Z86.0100 PERSONAL HISTORY OF COLONIC POLYPS: Status: ACTIVE | Noted: 2023-10-25

## 2023-10-25 PROBLEM — R06.02 SOB (SHORTNESS OF BREATH): Status: RESOLVED | Noted: 2021-10-21 | Resolved: 2023-10-25

## 2023-10-25 PROBLEM — M25.562 CHRONIC PAIN OF BOTH KNEES: Status: RESOLVED | Noted: 2022-08-17 | Resolved: 2023-10-25

## 2023-10-25 PROBLEM — G89.29 CHRONIC PAIN OF BOTH KNEES: Status: RESOLVED | Noted: 2022-08-17 | Resolved: 2023-10-25

## 2023-10-25 PROBLEM — R53.83 FATIGUE: Status: RESOLVED | Noted: 2021-10-21 | Resolved: 2023-10-25

## 2023-10-25 PROBLEM — M15.9 GENERALIZED OSTEOARTHRITIS OF MULTIPLE SITES: Status: ACTIVE | Noted: 2023-10-25

## 2023-10-25 PROBLEM — M15.9 GENERALIZED OSTEOARTHRITIS OF MULTIPLE SITES: Status: RESOLVED | Noted: 2023-10-25 | Resolved: 2023-10-25

## 2023-10-25 PROBLEM — R94.39 ABNORMAL STRESS TEST: Status: RESOLVED | Noted: 2021-11-16 | Resolved: 2023-10-25

## 2023-10-25 PROBLEM — M25.561 CHRONIC PAIN OF BOTH KNEES: Status: RESOLVED | Noted: 2022-08-17 | Resolved: 2023-10-25

## 2023-10-25 PROBLEM — R07.9 CHEST PAIN: Status: RESOLVED | Noted: 2021-10-21 | Resolved: 2023-10-25

## 2023-10-25 RX ORDER — ESTRADIOL 0.1 MG/D
1 FILM, EXTENDED RELEASE TRANSDERMAL 2 TIMES WEEKLY
Qty: 24 PATCH | Refills: 1 | Status: SHIPPED | OUTPATIENT
Start: 2023-10-26

## 2023-10-25 RX ORDER — PRAVASTATIN SODIUM 20 MG
20 TABLET ORAL DAILY
Qty: 90 TABLET | Refills: 1 | Status: SHIPPED | OUTPATIENT
Start: 2023-10-25

## 2023-10-25 RX ORDER — NORTRIPTYLINE HYDROCHLORIDE 25 MG/1
25 CAPSULE ORAL DAILY
Qty: 90 CAPSULE | Refills: 1 | Status: SHIPPED | OUTPATIENT
Start: 2023-10-25

## 2023-10-25 RX ORDER — LISINOPRIL 10 MG/1
10 TABLET ORAL DAILY
Qty: 90 TABLET | Refills: 1 | Status: SHIPPED | OUTPATIENT
Start: 2023-10-25

## 2023-10-25 NOTE — ASSESSMENT & PLAN NOTE
Patient's (Body mass index is 25.24 kg/m².) indicates that they are overweight with health conditions that include hypertension, dyslipidemias, and osteoarthritis . Weight is unchanged. BMI is is above average; BMI management plan is completed. We discussed portion control and increasing exercise.

## 2023-10-25 NOTE — PROGRESS NOTES
QUICK REFERENCE INFORMATION:  The ABCs of the Annual Wellness Visit    Subsequent Medicare Wellness Visit    @[email or web address]@    HEALTH RISK ASSESSMENT    1955    Recent Hospitalizations:  No hospitalization(s) within the last year..        Current Medical Providers:  Patient Care Team:  Eduardo Angelo MD as PCP - General (Family Medicine)        Smoking Status:  Social History     Tobacco Use   Smoking Status Never   Smokeless Tobacco Never       Alcohol Consumption:  Social History     Substance and Sexual Activity   Alcohol Use Not Currently       Depression Screen:       4/25/2023    10:47 AM   PHQ-2/PHQ-9 Depression Screening   Little Interest or Pleasure in Doing Things 0-->not at all   Feeling Down, Depressed or Hopeless 0-->not at all   PHQ-9: Brief Depression Severity Measure Score 0       Health Habits and Functional and Cognitive Screening:      10/25/2023     9:00 AM   Functional & Cognitive Status   Do you have difficulty preparing food and eating? No   Do you have difficulty bathing yourself, getting dressed or grooming yourself? No   Do you have difficulty using the toilet? No   Do you have difficulty moving around from place to place? No   Do you have trouble with steps or getting out of a bed or a chair? No   Current Diet Well Balanced Diet   Dental Exam Up to date   Eye Exam Up to date   Exercise (times per week) 4 times per week   Current Exercises Include [employer]   Do you need help using the phone?  No   Are you deaf or do you have serious difficulty hearing?  No   Do you need help to go to places out of walking distance? No   Do you need help shopping? No   Do you need help preparing meals?  No   Do you need help with housework?  No   Do you need help with laundry? No   Do you need help taking your medications? No   Do you need help managing money? No   Do you ever drive or ride in a car without wearing a seat belt? No   Have you felt unusual stress, anger or loneliness in the last month?  "No   Who do you live with? Spouse   If you need help, do you have trouble finding someone available to you? No   Have you been bothered in the last four weeks by sexual problems? No   Do you have difficulty concentrating, remembering or making decisions? No       Fall Risk Screen:  ELIZABET Fall Risk Assessment was completed, and patient is at LOW risk for falls.Assessment completed on:4/25/2023    ACE III MINI        Does the patient have evidence of cognitive impairment? No    Aspirin use counseling: Does not need ASA (and currently is not on it)    Recent Lab Results:  CMP:  Lab Results   Component Value Date    BUN 17 04/25/2023    CREATININE 0.86 04/25/2023    EGFRIFNONA 59 (L) 11/24/2021    BCR 20 04/25/2023     04/25/2023    K 5.1 04/25/2023    CO2 25 04/25/2023    CALCIUM 9.0 04/25/2023    PROTENTOTREF 6.6 04/25/2023    ALBUMIN 4.2 04/25/2023    LABGLOBREF 2.4 04/25/2023    LABIL2 1.8 04/25/2023    BILITOT 0.4 04/25/2023    ALKPHOS 71 04/25/2023    AST 20 04/25/2023    ALT 10 04/25/2023     HbA1c:  No results found for: \"HGBA1C\"  Microalbumin:  No results found for: \"MICROALBUR\", \"POCMALB\", \"POCCREAT\"  Lipid Panel  Lab Results   Component Value Date    CHOL 148 11/24/2021    TRIG 188 (H) 04/25/2023    HDL 65 04/25/2023     (H) 04/25/2023    AST 20 04/25/2023    ALT 10 04/25/2023       Visual Acuity:  No results found.    Age-appropriate Screening Schedule:  Refer to the list below for future screening recommendations based on patient's age, sex and/or medical conditions. Orders for these recommended tests are listed in the plan section. The patient has been provided with a written plan.    Health Maintenance   Topic Date Due    DXA SCAN  Never done    MAMMOGRAM  01/21/2021    HEPATITIS C SCREENING  Never done    INFLUENZA VACCINE  08/01/2023    COVID-19 Vaccine (7 - 2023-24 season) 09/03/2023    ZOSTER VACCINE (1 of 2) 10/25/2024 (Originally 7/2/2007)    COLORECTAL CANCER SCREENING  01/16/2024    " LIPID PANEL  04/25/2024    ANNUAL WELLNESS VISIT  10/25/2024    BMI FOLLOWUP  10/25/2024    TDAP/TD VACCINES (2 - Td or Tdap) 06/05/2028    Pneumococcal Vaccine 65+  Completed        Subjective   History of Present Illness    Sharda Bowen is a 68 y.o. female who presents for a Subsequent Wellness Visit and to establish care with me.    On estradiol for menopausal symptoms - due for mammogram.  We discussed the importance of this today.  Mammogram ordered and she understands we cannot continue with hormone treatment unless this gets completed.  We discussed the risk for blood clots, strokes, heart disease and breast cancer with hormone supplementation.    Due for DEXA and discussed today.  Ordered DEXA    On nortriptyline for chronic diarrhea/ibs    Colonoscopy 1/2019 with dr villegas and she did have polyps again on this followup colonoscopy with plan for repeat in 5 yrs - discussed she is due Jan 2024.  Declines colonscopy given problems with bowels after last testing.  Would like cologuard    On pravachol for hyperlipidemia and lisinopril for HTN    Discussed HepC screening today - Willing for cologuard    Discussed vaccinations today - Shingrix, flu, and covid19 booster. Had shingrix at pharmacy and will get us dates.  Plans for covid booster.  Ready for flu shot today    Problems with fatigue and discussed reducing nortriptyline.  She will consider but wants vit levels checked with labs.  Wants to wait on carlota workup     CHRONIC CONDITIONS    The following portions of the patient's history were reviewed and updated as appropriate: allergies, current medications, past family history, past medical history, past social history, past surgical history, and problem list.    Outpatient Medications Prior to Visit   Medication Sig Dispense Refill    Multiple Vitamins-Minerals (PRESERVISION AREDS 2 PO) Take 1 capsule by mouth Daily.      multivitamin with minerals tablet tablet Take 1 tablet by mouth Daily.      Xiidra 5 %  ophthalmic solution Apply 1 drop to eye(s) as directed by provider 2 (Two) Times a Day.      estradiol (VIVELLE-DOT) 0.1 MG/24HR patch Place 1 patch on the skin as directed by provider 2 (Two) Times a Week. 48 patch 1    lisinopril (PRINIVIL,ZESTRIL) 10 MG tablet Take 1 tablet by mouth Daily. 30 tablet 0    nortriptyline (PAMELOR) 25 MG capsule Take 1 capsule by mouth Daily. 90 capsule 1    pravastatin (PRAVACHOL) 20 MG tablet Take 1 tablet by mouth Daily. For cholesterol 90 tablet 1    meloxicam (MOBIC) 15 MG tablet Take 1 tablet by mouth Daily. For joint pain (Patient not taking: Reported on 10/25/2023) 90 tablet 1     No facility-administered medications prior to visit.       Patient Active Problem List   Diagnosis    Essential hypertension    Postmenopausal syndrome    Chronic diarrhea    Mixed hyperlipidemia    Screening mammogram for breast cancer    General medical exam    High risk medication use    Encounter for osteoporosis screening in asymptomatic postmenopausal patient    Personal history of colonic polyps    Overweight (BMI 25.0-29.9)    Need for hepatitis C screening test    Chronic fatigue       Advance Care Planning:  ACP discussion was held with the patient during this visit. Patient does not have an advance directive, information provided.    Identification of Risk Factors:  Risk factors include: Advance Directive Discussion  Breast Cancer/Mammogram Screening  Colon Cancer Screening  Immunizations Discussed/Encouraged (specific immunizations; COVID19 )  Osteoporosis Risk.    Review of Systems   Constitutional:  Positive for fatigue. Negative for appetite change, chills, fever and unexpected weight change.   HENT:  Negative for hearing loss.    Eyes:  Negative for visual disturbance.   Respiratory:  Negative for chest tightness, shortness of breath and wheezing.    Cardiovascular:  Negative for chest pain, palpitations and leg swelling.   Gastrointestinal:  Negative for abdominal pain.  "  Musculoskeletal:  Negative for arthralgias, back pain and gait problem.   Skin:  Negative for rash.   Neurological:  Negative for dizziness and headaches.   Psychiatric/Behavioral:  Negative for agitation and confusion. The patient is not nervous/anxious.        Compared to one year ago, the patient feels her physical health is the same.  Compared to one year ago, the patient feels her mental health is the same.    Objective     Physical Exam  Constitutional:       Appearance: Normal appearance.   HENT:      Head: Normocephalic.      Right Ear: External ear normal.      Left Ear: External ear normal.      Nose: Nose normal.   Eyes:      Pupils: Pupils are equal, round, and reactive to light.   Cardiovascular:      Rate and Rhythm: Normal rate and regular rhythm.      Heart sounds: Normal heart sounds.   Pulmonary:      Effort: Pulmonary effort is normal.      Breath sounds: Normal breath sounds.   Musculoskeletal:         General: Normal range of motion.      Cervical back: Normal range of motion.   Skin:     General: Skin is warm and dry.   Neurological:      General: No focal deficit present.      Mental Status: She is alert.   Psychiatric:         Mood and Affect: Mood normal.         Behavior: Behavior normal.         Thought Content: Thought content normal.          Procedures     Vitals:    10/25/23 0852   BP: 126/82   Pulse: 76   Temp: 97.6 °F (36.4 °C)   TempSrc: Infrared   SpO2: 99%   Weight: 82.1 kg (181 lb)   Height: 180.3 cm (71\")       BMI is >= 25 and <30. (Overweight) The following options were offered after discussion;: weight loss educational material (shared in after visit summary) and exercise counseling/recommendations      Lab Results   Component Value Date    WBC 5.9 04/25/2023    HGB 13.8 04/25/2023    HCT 42.8 04/25/2023    MCV 90 04/25/2023     04/25/2023     Lab Results   Component Value Date    GLUCOSE 80 04/25/2023    BUN 17 04/25/2023    CREATININE 0.86 04/25/2023    EGFRIFNONA " "59 (L) 11/24/2021    BCR 20 04/25/2023    K 5.1 04/25/2023    CO2 25 04/25/2023    CALCIUM 9.0 04/25/2023    PROTENTOTREF 6.6 04/25/2023    ALBUMIN 4.2 04/25/2023    LABIL2 1.8 04/25/2023    AST 20 04/25/2023    ALT 10 04/25/2023     Lab Results   Component Value Date    TSH 1.740 04/25/2023     No results found for: \"PSA\"  Lab Results   Component Value Date    CHOL 148 11/24/2021    CHLPL 255 (H) 04/25/2023    TRIG 188 (H) 04/25/2023    HDL 65 04/25/2023     (H) 04/25/2023       Assessment & Plan   Problem List Items Addressed This Visit       Essential hypertension (Chronic)    Relevant Medications    lisinopril (PRINIVIL,ZESTRIL) 10 MG tablet    Other Relevant Orders    CBC Auto Differential    Comprehensive Metabolic Panel    Lipid Panel    TSH    T4, Free    Postmenopausal syndrome (Chronic)    Relevant Medications    estradiol (VIVELLE-DOT) 0.1 MG/24HR patch (Start on 10/26/2023)    Chronic diarrhea (Chronic)    Relevant Medications    nortriptyline (PAMELOR) 25 MG capsule    Mixed hyperlipidemia (Chronic)    Relevant Medications    pravastatin (PRAVACHOL) 20 MG tablet    Other Relevant Orders    CBC Auto Differential    Comprehensive Metabolic Panel    Lipid Panel    TSH    T4, Free    Screening mammogram for breast cancer    Relevant Orders    Mammo Screening Bilateral With CAD    General medical exam - Primary    Current Assessment & Plan     Discussed together health maintenance and screening along with vaccination options and healthy diet and exercise habits as part of the preventative counseling at their physical exam today.     Updated with flu shot    Awaiting labs    Encouraged covid booster    She will get us dates for shingrix    Scheduling mammogram and dexa    Declines colonoscopy in Jan.  Rasheed ordered          High risk medication use (Chronic)    Encounter for osteoporosis screening in asymptomatic postmenopausal patient    Relevant Orders    DEXA Bone Density Axial    Personal " history of colonic polyps    Current Assessment & Plan     Last colonoscopy Jan 2019.  Given polyps she is due for repeat in Jan 2024.  Discussed today -          Overweight (BMI 25.0-29.9)    Current Assessment & Plan     Patient's (Body mass index is 25.24 kg/m².) indicates that they are overweight with health conditions that include hypertension, dyslipidemias, and osteoarthritis . Weight is unchanged. BMI is is above average; BMI management plan is completed. We discussed portion control and increasing exercise.          Need for hepatitis C screening test    Relevant Orders    HCV Antibody Rfx To Qnt PCR    Chronic fatigue     Other Visit Diagnoses       Needs flu shot        Relevant Orders    Fluzone High-Dose 65+yrs ([id number])    Screening for colon cancer        Relevant Orders    Cologuard - Stool, Per Rectum    Vitamin B12 deficiency        Relevant Orders    CBC Auto Differential    Vitamin B12    Vitamin D deficiency        Relevant Orders    Vitamin D,25-Hydroxy          Patient Self-Management and Personalized Health Advice  The patient has been provided with information about: diet, exercise, and weight management and preventive services including:   Annual Wellness Visit (AWV)  Bone Density Measurements  Influenza Vaccine and Administration  Screening Mammography .    Outpatient Encounter Medications as of 10/25/2023   Medication Sig Dispense Refill    [START ON 10/26/2023] estradiol (VIVELLE-DOT) 0.1 MG/24HR patch Place 1 patch on the skin as directed by provider 2 (Two) Times a Week. 24 patch 1    lisinopril (PRINIVIL,ZESTRIL) 10 MG tablet Take 1 tablet by mouth Daily. 90 tablet 1    Multiple Vitamins-Minerals (PRESERVISION AREDS 2 PO) Take 1 capsule by mouth Daily.      multivitamin with minerals tablet tablet Take 1 tablet by mouth Daily.      nortriptyline (PAMELOR) 25 MG capsule Take 1 capsule by mouth Daily. 90 capsule 1    pravastatin (PRAVACHOL) 20 MG tablet Take 1 tablet by mouth Daily.  For cholesterol 90 tablet 1    Xiidra 5 % ophthalmic solution Apply 1 drop to eye(s) as directed by provider 2 (Two) Times a Day.      [DISCONTINUED] estradiol (VIVELLE-DOT) 0.1 MG/24HR patch Place 1 patch on the skin as directed by provider 2 (Two) Times a Week. 48 patch 1    [DISCONTINUED] lisinopril (PRINIVIL,ZESTRIL) 10 MG tablet Take 1 tablet by mouth Daily. 30 tablet 0    [DISCONTINUED] nortriptyline (PAMELOR) 25 MG capsule Take 1 capsule by mouth Daily. 90 capsule 1    [DISCONTINUED] pravastatin (PRAVACHOL) 20 MG tablet Take 1 tablet by mouth Daily. For cholesterol 90 tablet 1    [DISCONTINUED] meloxicam (MOBIC) 15 MG tablet Take 1 tablet by mouth Daily. For joint pain (Patient not taking: Reported on 10/25/2023) 90 tablet 1     No facility-administered encounter medications on file as of 10/25/2023.       Reviewed use of high risk medication in the elderly: yes  Reviewed for potential of harmful drug interactions in the elderly: yes    Diagnoses and all orders for this visit:    1. General medical exam (Primary)  Assessment & Plan:  Discussed together health maintenance and screening along with vaccination options and healthy diet and exercise habits as part of the preventative counseling at their physical exam today.     Updated with flu shot    Awaiting labs    Encouraged covid booster    She will get us dates for shingrix    Scheduling mammogram and dexa    Declines colonoscopy in Jan.  Cologloyd ordered       2. Postmenopausal syndrome  -     estradiol (VIVELLE-DOT) 0.1 MG/24HR patch; Place 1 patch on the skin as directed by provider 2 (Two) Times a Week.  Dispense: 24 patch; Refill: 1    3. High risk medication use    4. Screening mammogram for breast cancer  -     Mammo Screening Bilateral With CAD; Future    5. Essential hypertension  -     lisinopril (PRINIVIL,ZESTRIL) 10 MG tablet; Take 1 tablet by mouth Daily.  Dispense: 90 tablet; Refill: 1  -     CBC Auto Differential; Future  -     Comprehensive  Metabolic Panel; Future  -     Lipid Panel; Future  -     TSH; Future  -     T4, Free; Future  -     CBC Auto Differential  -     Comprehensive Metabolic Panel  -     Lipid Panel  -     TSH  -     T4, Free    6. Mixed hyperlipidemia  -     pravastatin (PRAVACHOL) 20 MG tablet; Take 1 tablet by mouth Daily. For cholesterol  Dispense: 90 tablet; Refill: 1  -     CBC Auto Differential; Future  -     Comprehensive Metabolic Panel; Future  -     Lipid Panel; Future  -     TSH; Future  -     T4, Free; Future  -     CBC Auto Differential  -     Comprehensive Metabolic Panel  -     Lipid Panel  -     TSH  -     T4, Free    7. Chronic diarrhea  -     nortriptyline (PAMELOR) 25 MG capsule; Take 1 capsule by mouth Daily.  Dispense: 90 capsule; Refill: 1    8. Encounter for osteoporosis screening in asymptomatic postmenopausal patient  -     DEXA Bone Density Axial; Future    9. Personal history of colonic polyps  Assessment & Plan:  Last colonoscopy Jan 2019.  Given polyps she is due for repeat in Jan 2024.  Discussed today -       10. Needs flu shot  -     Fluzone High-Dose 65+yrs ([id number])    11. Screening for colon cancer  -     Cologuard - Stool, Per Rectum; Future    12. Overweight (BMI 25.0-29.9)  Assessment & Plan:  Patient's (Body mass index is 25.24 kg/m².) indicates that they are overweight with health conditions that include hypertension, dyslipidemias, and osteoarthritis . Weight is unchanged. BMI is is above average; BMI management plan is completed. We discussed portion control and increasing exercise.       13. Need for hepatitis C screening test  -     HCV Antibody Rfx To Qnt PCR; Future  -     HCV Antibody Rfx To Qnt PCR    14. Vitamin B12 deficiency  -     CBC Auto Differential; Future  -     Vitamin B12; Future  -     CBC Auto Differential  -     Vitamin B12    15. Vitamin D deficiency  -     Vitamin D,25-Hydroxy; Future  -     Vitamin D,25-Hydroxy    16. Chronic fatigue          Follow Up:  Return in  about 6 months (around 4/25/2024) for Med recheck, Fasting for labs at appointment (but drink water!).     There are no Patient Instructions on file for this visit.    An After Visit Summary and PPPS with all of these plans were given to the patient.         Answers submitted by the patient for this visit:  Primary Reason for Visit (Submitted on 10/19/2023)  What is the primary reason for your visit?: Physical

## 2023-10-25 NOTE — ASSESSMENT & PLAN NOTE
Discussed together health maintenance and screening along with vaccination options and healthy diet and exercise habits as part of the preventative counseling at their physical exam today.     Updated with flu shot    Awaiting labs    Encouraged covid booster    She will get us dates for shingrix    Scheduling mammogram and dexa    Declines colonoscopy in Jan.  Rasheed ordered

## 2023-10-26 LAB
25(OH)D3+25(OH)D2 SERPL-MCNC: 49.8 NG/ML (ref 30–100)
ALBUMIN SERPL-MCNC: 4.4 G/DL (ref 3.9–4.9)
ALBUMIN/GLOB SERPL: 2 {RATIO} (ref 1.2–2.2)
ALP SERPL-CCNC: 66 IU/L (ref 44–121)
ALT SERPL-CCNC: 12 IU/L (ref 0–32)
AST SERPL-CCNC: 24 IU/L (ref 0–40)
BASOPHILS # BLD AUTO: 0.1 X10E3/UL (ref 0–0.2)
BASOPHILS NFR BLD AUTO: 1 %
BILIRUB SERPL-MCNC: 0.3 MG/DL (ref 0–1.2)
BUN SERPL-MCNC: 17 MG/DL (ref 8–27)
BUN/CREAT SERPL: 16 (ref 12–28)
CALCIUM SERPL-MCNC: 8.9 MG/DL (ref 8.7–10.3)
CHLORIDE SERPL-SCNC: 104 MMOL/L (ref 96–106)
CHOLEST SERPL-MCNC: 206 MG/DL (ref 100–199)
CO2 SERPL-SCNC: 24 MMOL/L (ref 20–29)
CREAT SERPL-MCNC: 1.08 MG/DL (ref 0.57–1)
EGFRCR SERPLBLD CKD-EPI 2021: 57 ML/MIN/1.73
EOSINOPHIL # BLD AUTO: 0.1 X10E3/UL (ref 0–0.4)
EOSINOPHIL NFR BLD AUTO: 1 %
ERYTHROCYTE [DISTWIDTH] IN BLOOD BY AUTOMATED COUNT: 13.5 % (ref 11.7–15.4)
GLOBULIN SER CALC-MCNC: 2.2 G/DL (ref 1.5–4.5)
GLUCOSE SERPL-MCNC: 90 MG/DL (ref 70–99)
HCT VFR BLD AUTO: 43.5 % (ref 34–46.6)
HCV AB SERPL QL IA: NORMAL
HCV IGG SERPL QL IA: NON REACTIVE
HDLC SERPL-MCNC: 67 MG/DL
HGB BLD-MCNC: 14.6 G/DL (ref 11.1–15.9)
IMM GRANULOCYTES # BLD AUTO: 0 X10E3/UL (ref 0–0.1)
IMM GRANULOCYTES NFR BLD AUTO: 1 %
LDLC SERPL CALC-MCNC: 112 MG/DL (ref 0–99)
LYMPHOCYTES # BLD AUTO: 2.1 X10E3/UL (ref 0.7–3.1)
LYMPHOCYTES NFR BLD AUTO: 33 %
MCH RBC QN AUTO: 30 PG (ref 26.6–33)
MCHC RBC AUTO-ENTMCNC: 33.6 G/DL (ref 31.5–35.7)
MCV RBC AUTO: 90 FL (ref 79–97)
MONOCYTES # BLD AUTO: 0.4 X10E3/UL (ref 0.1–0.9)
MONOCYTES NFR BLD AUTO: 6 %
NEUTROPHILS # BLD AUTO: 3.7 X10E3/UL (ref 1.4–7)
NEUTROPHILS NFR BLD AUTO: 58 %
PLATELET # BLD AUTO: 250 X10E3/UL (ref 150–450)
POTASSIUM SERPL-SCNC: 4.6 MMOL/L (ref 3.5–5.2)
PROT SERPL-MCNC: 6.6 G/DL (ref 6–8.5)
RBC # BLD AUTO: 4.86 X10E6/UL (ref 3.77–5.28)
SODIUM SERPL-SCNC: 141 MMOL/L (ref 134–144)
T4 FREE SERPL-MCNC: 0.84 NG/DL (ref 0.82–1.77)
TRIGL SERPL-MCNC: 158 MG/DL (ref 0–149)
TSH SERPL DL<=0.005 MIU/L-ACNC: 1.74 UIU/ML (ref 0.45–4.5)
VIT B12 SERPL-MCNC: 732 PG/ML (ref 232–1245)
VLDLC SERPL CALC-MCNC: 27 MG/DL (ref 5–40)
WBC # BLD AUTO: 6.4 X10E3/UL (ref 3.4–10.8)

## 2023-11-09 ENCOUNTER — TELEPHONE (OUTPATIENT)
Dept: FAMILY MEDICINE CLINIC | Facility: CLINIC | Age: 68
End: 2023-11-09

## 2023-11-09 ENCOUNTER — TELEMEDICINE (OUTPATIENT)
Dept: FAMILY MEDICINE CLINIC | Facility: CLINIC | Age: 68
End: 2023-11-09
Payer: MEDICARE

## 2023-11-09 DIAGNOSIS — U07.1 COVID-19 VIRUS INFECTION: Primary | ICD-10-CM

## 2023-11-09 NOTE — TELEPHONE ENCOUNTER
Caller: Sharda Bowen    Relationship to patient: Self    Best call back number: [phone]     Date of exposure: UNKNOWN    Date of positive COVID19 test: 11/09/2023    Date if possible COVID19 exposure: UNKNOWN     COVID19 symptoms: BODY ACHES, HEADACHE, CHILLS, FEELS LIKE SHE HAS A FEVER HAS SHE GETS HOT AND COLD    Date of initial quarantine: 11/07/2023    Additional information or concerns: PATIENT HAS HAD SYMPTOMS FOR 2 DAYS SO SHE TOOK A HOME COVID TEST TODAY 11/09/23 AND IT WAS POSITIVE. PATIENT IS REQUESTING MEDICATION TO HELP TREAT HER SYMPTOMS     What is the patients preferred pharmacy:   [facility] [address] [place] [address] [phone] [facility] [phone]

## 2023-11-09 NOTE — ASSESSMENT & PLAN NOTE
Discussed together treatment options for COVID-19 infection and her symptoms have been present less than 5 days.    She is interested in treatment with Paxlovid.  We did discuss use, side effects, and expectations with Paxlovid.  Renal function reviewed and her GFR has been over 60 and she was a little dehydrated with last labs we will treat with full dosing of Paxlovid.    Discussed need to hold her statin while on Paxlovid and she may resume statin after completion of Paxlovid treatment.    Contact precautions and quarantine discussed and recommended.  We did also discuss the timing of her next COVID vaccination and I did recommend she wait 8 to 12 weeks before getting her COVID booster after recent COVID infection.

## 2024-04-25 ENCOUNTER — OFFICE VISIT (OUTPATIENT)
Dept: FAMILY MEDICINE CLINIC | Facility: CLINIC | Age: 69
End: 2024-04-25
Payer: COMMERCIAL

## 2024-04-25 VITALS
BODY MASS INDEX: 25.97 KG/M2 | DIASTOLIC BLOOD PRESSURE: 90 MMHG | SYSTOLIC BLOOD PRESSURE: 128 MMHG | OXYGEN SATURATION: 99 % | HEART RATE: 65 BPM | HEIGHT: 71 IN | WEIGHT: 185.5 LBS

## 2024-04-25 DIAGNOSIS — Z79.899 HIGH RISK MEDICATION USE: Chronic | ICD-10-CM

## 2024-04-25 DIAGNOSIS — B35.3 TINEA PEDIS OF LEFT FOOT: ICD-10-CM

## 2024-04-25 DIAGNOSIS — E66.3 OVERWEIGHT (BMI 25.0-29.9): ICD-10-CM

## 2024-04-25 DIAGNOSIS — Z12.11 SCREENING FOR COLON CANCER: ICD-10-CM

## 2024-04-25 DIAGNOSIS — Z86.010 PERSONAL HISTORY OF COLONIC POLYPS: ICD-10-CM

## 2024-04-25 DIAGNOSIS — K52.9 CHRONIC DIARRHEA: Chronic | ICD-10-CM

## 2024-04-25 DIAGNOSIS — E78.2 MIXED HYPERLIPIDEMIA: Chronic | ICD-10-CM

## 2024-04-25 DIAGNOSIS — N95.1 POSTMENOPAUSAL SYNDROME: Chronic | ICD-10-CM

## 2024-04-25 DIAGNOSIS — I10 ESSENTIAL HYPERTENSION: Primary | Chronic | ICD-10-CM

## 2024-04-25 PROBLEM — Z11.59 NEED FOR HEPATITIS C SCREENING TEST: Status: RESOLVED | Noted: 2023-10-25 | Resolved: 2024-04-25

## 2024-04-25 PROBLEM — Z78.0 ENCOUNTER FOR OSTEOPOROSIS SCREENING IN ASYMPTOMATIC POSTMENOPAUSAL PATIENT: Status: RESOLVED | Noted: 2023-10-25 | Resolved: 2024-04-25

## 2024-04-25 PROBLEM — U07.1 COVID-19 VIRUS INFECTION: Status: RESOLVED | Noted: 2023-11-09 | Resolved: 2024-04-25

## 2024-04-25 PROBLEM — Z13.820 ENCOUNTER FOR OSTEOPOROSIS SCREENING IN ASYMPTOMATIC POSTMENOPAUSAL PATIENT: Status: RESOLVED | Noted: 2023-10-25 | Resolved: 2024-04-25

## 2024-04-25 PROBLEM — Z86.0100 PERSONAL HISTORY OF COLONIC POLYPS: Chronic | Status: ACTIVE | Noted: 2023-10-25

## 2024-04-25 PROCEDURE — 99214 OFFICE O/P EST MOD 30 MIN: CPT | Performed by: FAMILY MEDICINE

## 2024-04-25 RX ORDER — ESTRADIOL 0.1 MG/D
1 FILM, EXTENDED RELEASE TRANSDERMAL 2 TIMES WEEKLY
Qty: 24 PATCH | Refills: 2 | Status: SHIPPED | OUTPATIENT
Start: 2024-04-25

## 2024-04-25 RX ORDER — PRAVASTATIN SODIUM 20 MG
20 TABLET ORAL DAILY
Qty: 90 TABLET | Refills: 2 | Status: SHIPPED | OUTPATIENT
Start: 2024-04-25

## 2024-04-25 RX ORDER — LISINOPRIL 10 MG/1
10 TABLET ORAL DAILY
Qty: 90 TABLET | Refills: 2 | Status: SHIPPED | OUTPATIENT
Start: 2024-04-25

## 2024-04-25 RX ORDER — NORTRIPTYLINE HYDROCHLORIDE 25 MG/1
25 CAPSULE ORAL NIGHTLY
Qty: 90 CAPSULE | Refills: 2 | Status: SHIPPED | OUTPATIENT
Start: 2024-04-25 | End: 2024-04-25 | Stop reason: SDUPTHER

## 2024-04-25 RX ORDER — CLOTRIMAZOLE AND BETAMETHASONE DIPROPIONATE 10; .64 MG/G; MG/G
1 CREAM TOPICAL 2 TIMES DAILY PRN
Qty: 45 G | Refills: 3 | Status: SHIPPED | OUTPATIENT
Start: 2024-04-25

## 2024-04-25 RX ORDER — NORTRIPTYLINE HYDROCHLORIDE 10 MG/1
10 CAPSULE ORAL NIGHTLY
Qty: 90 CAPSULE | Refills: 2 | Status: SHIPPED | OUTPATIENT
Start: 2024-04-25

## 2024-04-25 NOTE — ASSESSMENT & PLAN NOTE
Patient's (Body mass index is 25.87 kg/m².) indicates that they are overweight with health conditions that include hypertension, dyslipidemias, and osteoarthritis . Weight is unchanged. BMI is is above average; BMI management plan is completed. We discussed portion control and increasing exercise.

## 2024-04-25 NOTE — ASSESSMENT & PLAN NOTE
Lipid abnormalities are improving with treatment    Plan:  Continue same medication/s without change.      Discussed medication dosage, use, side effects, and goals of treatment in detail.    Counseled patient on lifestyle modifications to help control hyperlipidemia.     Patient Treatment Goals:   LDL goal is under 130    Followup at the next regular appointment.

## 2024-04-25 NOTE — ASSESSMENT & PLAN NOTE
Mild elevation today    Will work on diet/exercise and restart home BP checks and let us know if over 140/90

## 2024-04-25 NOTE — PROGRESS NOTES
"Chief Complaint  Hypertension and Hyperlipidemia    Subjective    History of Present Illness:  Sharda Bowen is a 68 y.o. female who presents today for 6 month followup visit and medication refills    Doing well since last visit in Oct for AWV and physical exam    On estradiol for menopausal symptoms.  Mammogram uptodate 11/3/23  We discussed the risk for blood clots, strokes, heart disease and breast cancer with hormone supplementation.  She does not smoke.  She voiced understanding and wants to continue estradiol    Mild BP elevation today - she will work on diet/exercise and restart home BP checks.     DEXA 11/3/23 returned normal.  Plan for repeat in Nov 2025    On nortriptyline for chronic diarrhea/ibs    Colonoscopy 1/2019 with dr villegas and she did have polyps again on this followup colonoscopy with plan for repeat in 5 yrs - discussed she is past-due given polyps.  Declines colonscopy given problems with bowels after last testing.  Cologuard 1/22/24 - plan to repeat in Jan 2027    On pravachol for hyperlipidemia and lisinopril for HTN    Problems with fatigue and discussed reducing nortriptyline.  Wants to try reducing to 10mg with nortriptyline    Objective   Vital Signs:   /90 (BP Location: Left arm, Patient Position: Sitting, Cuff Size: Adult)   Pulse 65   Ht 180.3 cm (71\")   Wt 84.1 kg (185 lb 8 oz)   SpO2 99%   BMI 25.87 kg/m²     Review of Systems   Constitutional:  Positive for fatigue. Negative for appetite change, chills and fever.   HENT:  Negative for hearing loss.    Eyes:  Negative for blurred vision.   Respiratory:  Negative for chest tightness.    Cardiovascular:  Negative for chest pain.   Gastrointestinal:  Negative for abdominal pain.   Musculoskeletal:  Negative for gait problem.   Skin:  Positive for rash.   Psychiatric/Behavioral:  Negative for depressed mood.        Past History:  Medical History: has a past medical history of Anemia, Arthritis, Chest pain, Diverticulitis, " Fibroids, Hyperlipidemia, Hypertension, Mixed hyperlipidemia, Perforated bowel, Rupture of gluteus minimus tendon, and Spinal artery aneurysm.   Surgical History: has a past surgical history that includes Hysterectomy (2011); Cholecystectomy (2012); AAA repair, open (2013); Hernia repair (2013); Back surgery (2015); Cardiac catheterization (Left, 11/24/2021); and Cataract extraction, bilateral.   Family History: family history includes Breast cancer in her mother; Heart attack in her mother; Hypertension in her mother.   Social History: reports that she has never smoked. She has never used smokeless tobacco. She reports that she does not currently use alcohol. She reports that she does not use drugs.      Current Outpatient Medications:     estradiol (VIVELLE-DOT) 0.1 MG/24HR patch, Place 1 patch on the skin as directed by provider 2 (Two) Times a Week., Disp: 24 patch, Rfl: 2    lisinopril (PRINIVIL,ZESTRIL) 10 MG tablet, Take 1 tablet by mouth Daily., Disp: 90 tablet, Rfl: 2    Multiple Vitamins-Minerals (PRESERVISION AREDS 2 PO), Take 1 capsule by mouth Daily., Disp: , Rfl:     multivitamin with minerals tablet tablet, Take 1 tablet by mouth Daily., Disp: , Rfl:     nortriptyline (PAMELOR) 10 MG capsule, Take 1 capsule by mouth Every Night., Disp: 90 capsule, Rfl: 2    pravastatin (PRAVACHOL) 20 MG tablet, Take 1 tablet by mouth Daily. For cholesterol, Disp: 90 tablet, Rfl: 2    Xiidra 5 % ophthalmic solution, Apply 1 drop to eye(s) as directed by provider 2 (Two) Times a Day., Disp: , Rfl:     clotrimazole-betamethasone (Lotrisone) 1-0.05 % cream, Apply 1 Application topically to the appropriate area as directed 2 (Two) Times a Day As Needed (rash on foot)., Disp: 45 g, Rfl: 3    Allergies: Dexamethasone, Ciprofloxacin, and Metronidazole    Physical Exam  Constitutional:       Appearance: Normal appearance.   HENT:      Head: Normocephalic.      Right Ear: External ear normal.      Left Ear: External ear normal.       Nose: Nose normal.   Eyes:      Pupils: Pupils are equal, round, and reactive to light.   Cardiovascular:      Rate and Rhythm: Normal rate and regular rhythm.      Heart sounds: Normal heart sounds.   Pulmonary:      Effort: Pulmonary effort is normal.      Breath sounds: Normal breath sounds.   Musculoskeletal:         General: Normal range of motion.      Cervical back: Normal range of motion.   Skin:     Comments: Mild tinea pedis L foot    Neurological:      General: No focal deficit present.      Mental Status: She is alert.   Psychiatric:         Mood and Affect: Mood normal.         Behavior: Behavior normal.         Thought Content: Thought content normal.          Result Review                   Assessment and Plan  Diagnoses and all orders for this visit:    1. Essential hypertension (Primary)  Assessment & Plan:  Mild elevation today    Will work on diet/exercise and restart home BP checks and let us know if over 140/90    Orders:  -     Comprehensive Metabolic Panel; Future  -     Lipid Panel; Future  -     lisinopril (PRINIVIL,ZESTRIL) 10 MG tablet; Take 1 tablet by mouth Daily.  Dispense: 90 tablet; Refill: 2  -     Comprehensive Metabolic Panel  -     Lipid Panel    2. Mixed hyperlipidemia  Assessment & Plan:   Lipid abnormalities are improving with treatment    Plan:  Continue same medication/s without change.      Discussed medication dosage, use, side effects, and goals of treatment in detail.    Counseled patient on lifestyle modifications to help control hyperlipidemia.     Patient Treatment Goals:   LDL goal is under 130    Followup at the next regular appointment.    Orders:  -     Comprehensive Metabolic Panel; Future  -     Lipid Panel; Future  -     pravastatin (PRAVACHOL) 20 MG tablet; Take 1 tablet by mouth Daily. For cholesterol  Dispense: 90 tablet; Refill: 2  -     Comprehensive Metabolic Panel  -     Lipid Panel    3. Chronic diarrhea  Assessment & Plan:  Cont nortriptyline but  given trial with dose reduction to 10mg     Orders:  -     Discontinue: nortriptyline (PAMELOR) 25 MG capsule; Take 1 capsule by mouth Every Night.  Dispense: 90 capsule; Refill: 2  -     nortriptyline (PAMELOR) 10 MG capsule; Take 1 capsule by mouth Every Night.  Dispense: 90 capsule; Refill: 2    4. Postmenopausal syndrome  Assessment & Plan:  Doing well with current regimen    Understands risks    Mammogram uptodate Nov 2023    Orders:  -     estradiol (VIVELLE-DOT) 0.1 MG/24HR patch; Place 1 patch on the skin as directed by provider 2 (Two) Times a Week.  Dispense: 24 patch; Refill: 2    5. High risk medication use  Assessment & Plan:  See above    Mammogram uptodate       6. Personal history of colonic polyps  Assessment & Plan:  Declines colonosocopy    Neg cologuard Jan 2024.  Plans for repeat in Jan 2027      7. Screening for colon cancer  Assessment & Plan:  Cologuard neg Jan 2024.  Plans for repeat in Jan 2027      8. Overweight (BMI 25.0-29.9)  Assessment & Plan:  Patient's (Body mass index is 25.87 kg/m².) indicates that they are overweight with health conditions that include hypertension, dyslipidemias, and osteoarthritis . Weight is unchanged. BMI is is above average; BMI management plan is completed. We discussed portion control and increasing exercise.       9. Tinea pedis of left foot  -     clotrimazole-betamethasone (Lotrisone) 1-0.05 % cream; Apply 1 Application topically to the appropriate area as directed 2 (Two) Times a Day As Needed (rash on foot).  Dispense: 45 g; Refill: 3                   Follow Up  Return in 6 months (on 10/28/2024) for Medicare Wellness.    Eduardo Angelo MD

## 2024-04-26 LAB
ALBUMIN SERPL-MCNC: 4.4 G/DL (ref 3.9–4.9)
ALBUMIN/GLOB SERPL: 1.8 {RATIO} (ref 1.2–2.2)
ALP SERPL-CCNC: 62 IU/L (ref 44–121)
ALT SERPL-CCNC: 13 IU/L (ref 0–32)
AST SERPL-CCNC: 23 IU/L (ref 0–40)
BILIRUB SERPL-MCNC: 0.3 MG/DL (ref 0–1.2)
BUN SERPL-MCNC: 22 MG/DL (ref 8–27)
BUN/CREAT SERPL: 21 (ref 12–28)
CALCIUM SERPL-MCNC: 9.3 MG/DL (ref 8.7–10.3)
CHLORIDE SERPL-SCNC: 102 MMOL/L (ref 96–106)
CHOLEST SERPL-MCNC: 190 MG/DL (ref 100–199)
CO2 SERPL-SCNC: 23 MMOL/L (ref 20–29)
CREAT SERPL-MCNC: 1.07 MG/DL (ref 0.57–1)
EGFRCR SERPLBLD CKD-EPI 2021: 57 ML/MIN/1.73
GLOBULIN SER CALC-MCNC: 2.4 G/DL (ref 1.5–4.5)
GLUCOSE SERPL-MCNC: 75 MG/DL (ref 70–99)
HDLC SERPL-MCNC: 67 MG/DL
LDLC SERPL CALC-MCNC: 96 MG/DL (ref 0–99)
POTASSIUM SERPL-SCNC: 4.3 MMOL/L (ref 3.5–5.2)
PROT SERPL-MCNC: 6.8 G/DL (ref 6–8.5)
SODIUM SERPL-SCNC: 140 MMOL/L (ref 134–144)
TRIGL SERPL-MCNC: 158 MG/DL (ref 0–149)
VLDLC SERPL CALC-MCNC: 27 MG/DL (ref 5–40)

## 2024-09-23 ENCOUNTER — OFFICE VISIT (OUTPATIENT)
Dept: FAMILY MEDICINE CLINIC | Facility: CLINIC | Age: 69
End: 2024-09-23
Payer: MEDICARE

## 2024-09-23 VITALS
BODY MASS INDEX: 25.93 KG/M2 | HEART RATE: 80 BPM | OXYGEN SATURATION: 96 % | HEIGHT: 71 IN | DIASTOLIC BLOOD PRESSURE: 82 MMHG | WEIGHT: 185.2 LBS | SYSTOLIC BLOOD PRESSURE: 122 MMHG

## 2024-09-23 DIAGNOSIS — J01.00 ACUTE NON-RECURRENT MAXILLARY SINUSITIS: Primary | ICD-10-CM

## 2024-09-23 PROCEDURE — 99213 OFFICE O/P EST LOW 20 MIN: CPT | Performed by: PHYSICIAN ASSISTANT

## 2024-09-23 PROCEDURE — 1160F RVW MEDS BY RX/DR IN RCRD: CPT | Performed by: PHYSICIAN ASSISTANT

## 2024-09-23 PROCEDURE — 3079F DIAST BP 80-89 MM HG: CPT | Performed by: PHYSICIAN ASSISTANT

## 2024-09-23 PROCEDURE — 1159F MED LIST DOCD IN RCRD: CPT | Performed by: PHYSICIAN ASSISTANT

## 2024-09-23 PROCEDURE — 3074F SYST BP LT 130 MM HG: CPT | Performed by: PHYSICIAN ASSISTANT

## 2024-09-23 RX ORDER — CHLORCYCLIZINE HYDROCHLORIDE AND PSEUDOEPHEDRINE HYDROCHLORIDE 25; 60 MG/1; MG/1
1 TABLET ORAL 2 TIMES DAILY
Qty: 30 TABLET | Refills: 0 | Status: SHIPPED | OUTPATIENT
Start: 2024-09-23

## 2024-09-23 RX ORDER — METHYLPREDNISOLONE 4 MG
TABLET, DOSE PACK ORAL
Qty: 21 TABLET | Refills: 0 | Status: SHIPPED | OUTPATIENT
Start: 2024-09-23

## 2024-10-30 ENCOUNTER — OFFICE VISIT (OUTPATIENT)
Dept: FAMILY MEDICINE CLINIC | Facility: CLINIC | Age: 69
End: 2024-10-30
Payer: MEDICARE

## 2024-10-30 VITALS
OXYGEN SATURATION: 94 % | HEART RATE: 68 BPM | SYSTOLIC BLOOD PRESSURE: 130 MMHG | WEIGHT: 189 LBS | HEIGHT: 71 IN | DIASTOLIC BLOOD PRESSURE: 80 MMHG | BODY MASS INDEX: 26.46 KG/M2

## 2024-10-30 DIAGNOSIS — Z86.0100 HISTORY OF COLONIC POLYPS: Chronic | ICD-10-CM

## 2024-10-30 DIAGNOSIS — I10 ESSENTIAL HYPERTENSION: Chronic | ICD-10-CM

## 2024-10-30 DIAGNOSIS — Z12.11 SCREENING FOR COLON CANCER: ICD-10-CM

## 2024-10-30 DIAGNOSIS — Z79.899 HIGH RISK MEDICATION USE: Chronic | ICD-10-CM

## 2024-10-30 DIAGNOSIS — L20.89 OTHER ATOPIC DERMATITIS: ICD-10-CM

## 2024-10-30 DIAGNOSIS — Z00.00 GENERAL MEDICAL EXAM: Primary | ICD-10-CM

## 2024-10-30 DIAGNOSIS — G89.29 CHRONIC PAIN OF RIGHT KNEE: ICD-10-CM

## 2024-10-30 DIAGNOSIS — M25.561 CHRONIC PAIN OF RIGHT KNEE: ICD-10-CM

## 2024-10-30 DIAGNOSIS — N95.1 POSTMENOPAUSAL SYNDROME: Chronic | ICD-10-CM

## 2024-10-30 DIAGNOSIS — E78.2 MIXED HYPERLIPIDEMIA: Chronic | ICD-10-CM

## 2024-10-30 DIAGNOSIS — K52.9 CHRONIC DIARRHEA: Chronic | ICD-10-CM

## 2024-10-30 DIAGNOSIS — J30.1 SEASONAL ALLERGIC RHINITIS DUE TO POLLEN: ICD-10-CM

## 2024-10-30 DIAGNOSIS — Z12.31 SCREENING MAMMOGRAM FOR BREAST CANCER: ICD-10-CM

## 2024-10-30 DIAGNOSIS — E66.3 OVERWEIGHT (BMI 25.0-29.9): ICD-10-CM

## 2024-10-30 DIAGNOSIS — R53.82 CHRONIC FATIGUE: ICD-10-CM

## 2024-10-30 PROBLEM — J01.00 ACUTE NON-RECURRENT MAXILLARY SINUSITIS: Status: RESOLVED | Noted: 2024-09-23 | Resolved: 2024-10-30

## 2024-10-30 RX ORDER — NORTRIPTYLINE HYDROCHLORIDE 10 MG/1
10 CAPSULE ORAL NIGHTLY
Qty: 90 CAPSULE | Refills: 2 | Status: SHIPPED | OUTPATIENT
Start: 2024-10-30

## 2024-10-30 RX ORDER — MONTELUKAST SODIUM 10 MG/1
10 TABLET ORAL NIGHTLY
Qty: 90 TABLET | Refills: 2 | Status: SHIPPED | OUTPATIENT
Start: 2024-10-30

## 2024-10-30 RX ORDER — ESTRADIOL 0.1 MG/D
1 FILM, EXTENDED RELEASE TRANSDERMAL 2 TIMES WEEKLY
Qty: 24 PATCH | Refills: 2 | Status: SHIPPED | OUTPATIENT
Start: 2024-10-31

## 2024-10-30 RX ORDER — LISINOPRIL 10 MG/1
10 TABLET ORAL DAILY
Qty: 90 TABLET | Refills: 2 | Status: SHIPPED | OUTPATIENT
Start: 2024-10-30

## 2024-10-30 RX ORDER — CLOBETASOL PROPIONATE 0.5 MG/G
1 CREAM TOPICAL 2 TIMES DAILY PRN
Qty: 60 G | Refills: 5 | Status: SHIPPED | OUTPATIENT
Start: 2024-10-30

## 2024-10-30 RX ORDER — PRAVASTATIN SODIUM 20 MG
20 TABLET ORAL DAILY
Qty: 90 TABLET | Refills: 2 | Status: SHIPPED | OUTPATIENT
Start: 2024-10-30

## 2024-10-30 NOTE — ASSESSMENT & PLAN NOTE
Doing well with nortriptyline but discussed a trial tapering down and off nortriptyline to see if this is still needed based upon IBS with diarrhea.

## 2024-10-30 NOTE — ASSESSMENT & PLAN NOTE
Improved with nortriptyline dose reduction.  We did discuss she can try and taper off of nortriptyline to see if this is still needed for IBS with diarrhea

## 2024-10-30 NOTE — PROGRESS NOTES
Subjective   The ABCs of the Annual Wellness Visit  Medicare Wellness Visit      Sharda Bowen is a 69 y.o. patient who presents for a Medicare Wellness Visit.    The following portions of the patient's history were reviewed and   updated as appropriate: allergies, current medications, past family history, past medical history, past social history, past surgical history, and problem list.    Compared to one year ago, the patient's physical   health is the same.  Compared to one year ago, the patient's mental   health is the same.    Recent Hospitalizations:  She was not admitted to the hospital during the last year.     Current Medical Providers:  Patient Care Team:  Eduardo Angelo MD as PCP - General (Family Medicine)  Cindy Elder MD as Consulting Physician (Allergy)  Galen Fermin MD as Surgeon (Orthopedic Surgery)    Outpatient Medications Prior to Visit   Medication Sig Dispense Refill    Multiple Vitamins-Minerals (PRESERVISION AREDS 2 PO) Take 1 capsule by mouth Daily.      multivitamin with minerals tablet tablet Take 1 tablet by mouth Daily.      Xiidra 5 % ophthalmic solution Apply 1 drop to eye(s) as directed by provider 2 (Two) Times a Day.      estradiol (VIVELLE-DOT) 0.1 MG/24HR patch Place 1 patch on the skin as directed by provider 2 (Two) Times a Week. 24 patch 2    lisinopril (PRINIVIL,ZESTRIL) 10 MG tablet Take 1 tablet by mouth Daily. 90 tablet 2    nortriptyline (PAMELOR) 10 MG capsule Take 1 capsule by mouth Every Night. 90 capsule 2    pravastatin (PRAVACHOL) 20 MG tablet Take 1 tablet by mouth Daily. For cholesterol 90 tablet 2    Chlorcyclizine-Pseudoephed (Stahist AD) 25-60 MG tablet Take 1 tablet by mouth 2 (Two) Times a Day. 30 tablet 0    clotrimazole-betamethasone (Lotrisone) 1-0.05 % cream Apply 1 Application topically to the appropriate area as directed 2 (Two) Times a Day As Needed (rash on foot). (Patient not taking: Reported on 10/30/2024) 45 g 3     "methylPREDNISolone (MEDROL) 4 MG dose pack Take as directed on package instructions. 21 tablet 0     No facility-administered medications prior to visit.     No opioid medication identified on active medication list. I have reviewed chart for other potential  high risk medication/s and harmful drug interactions in the elderly.      Aspirin is not on active medication list.  Aspirin use is not indicated based on review of current medical condition/s. Risk of harm outweighs potential benefits.  .    Patient Active Problem List   Diagnosis    Essential hypertension    Postmenopausal syndrome    Chronic diarrhea    Chronic pain of right knee    Mixed hyperlipidemia    Screening mammogram for breast cancer    General medical exam    High risk medication use    Personal history of colonic polyps    Overweight (BMI 25.0-29.9)    Chronic fatigue    Screening for colon cancer    Tinea pedis of left foot    Seasonal allergic rhinitis due to pollen    Other atopic dermatitis     Advance Care Planning Advance Directive is not on file.  ACP discussion was held with the patient during this visit. Patient does not have an advance directive, information provided.            Objective   Vitals:    10/30/24 0806   BP: 130/80   Pulse: 68   SpO2: 94%   Weight: 85.7 kg (189 lb)   Height: 180.3 cm (71\")   PainSc: 0-No pain       Estimated body mass index is 26.36 kg/m² as calculated from the following:    Height as of this encounter: 180.3 cm (71\").    Weight as of this encounter: 85.7 kg (189 lb).            Does the patient have evidence of cognitive impairment? No                                                                                                Health  Risk Assessment    Smoking Status:  Social History     Tobacco Use   Smoking Status Never   Smokeless Tobacco Never     Alcohol Consumption:  Social History     Substance and Sexual Activity   Alcohol Use Not Currently       Fall Risk Screen  STEADI Fall Risk Assessment was " completed, and patient is at LOW risk for falls.Assessment completed on:10/30/2024    Depression Screening:      10/30/2024     8:01 AM   PHQ-2/PHQ-9 Depression Screening   Little interest or pleasure in doing things Not at all   Feeling down, depressed, or hopeless Not at all     Health Habits and Functional and Cognitive Screening:      10/27/2024     7:42 AM   Functional & Cognitive Status   Do you have difficulty preparing food and eating? No    Do you have difficulty bathing yourself, getting dressed or grooming yourself? No    Do you have difficulty using the toilet? No    Do you have difficulty moving around from place to place? No    Do you have trouble with steps or getting out of a bed or a chair? No    Current Diet Well Balanced Diet    Dental Exam Up to date    Eye Exam Up to date    Exercise (times per week) 5 times per week    Current Exercises Include Gardening;Hiking;Home Exercise Program (TV, Computer, Etc.);House Cleaning;Light Weights;Walking;Yard Work    Do you need help using the phone?  No    Are you deaf or do you have serious difficulty hearing?  No    Do you need help to go to places out of walking distance? No    Do you need help shopping? No    Do you need help preparing meals?  No    Do you need help with housework?  No    Do you need help with laundry? No    Do you need help taking your medications? No    Do you need help managing money? No    Do you ever drive or ride in a car without wearing a seat belt? No    Have you felt unusual stress, anger or loneliness in the last month? No    Who do you live with? Spouse    If you need help, do you have trouble finding someone available to you? No    Have you been bothered in the last four weeks by sexual problems? No    Do you have difficulty concentrating, remembering or making decisions? No        Patient-reported           Age-appropriate Screening Schedule:  Refer to the list below for future screening recommendations based on patient's  age, sex and/or medical conditions. Orders for these recommended tests are listed in the plan section. The patient has been provided with a written plan.    Health Maintenance List  Health Maintenance   Topic Date Due    LIPID PANEL  04/25/2025    ANNUAL WELLNESS VISIT  10/30/2025    BMI FOLLOWUP  10/30/2025    DXA SCAN  11/03/2025    MAMMOGRAM  11/06/2025    COLORECTAL CANCER SCREENING  01/22/2027    TDAP/TD VACCINES (2 - Td or Tdap) 06/05/2028    HEPATITIS C SCREENING  Completed    COVID-19 Vaccine  Completed    INFLUENZA VACCINE  Completed    Pneumococcal Vaccine 65+  Completed    ZOSTER VACCINE  Completed                                                                                                                                                CMS Preventative Services Quick Reference  Risk Factors Identified During Encounter  Fall Risk-High or Moderate: Discussed Fall Prevention in the home  Immunizations Discussed/Encouraged: Shingrix    The above risks/problems have been discussed with the patient.  Pertinent information has been shared with the patient in the After Visit Summary.  An After Visit Summary and PPPS were made available to the patient.    Follow Up:   Next Medicare Wellness visit to be scheduled in 1 year.         Additional E&M Note during same encounter follows:  Patient has additional, significant, and separately identifiable condition(s)/problem(s) that require work above and beyond the Medicare Wellness Visit     Chief Complaint  Hypertension and Hyperlipidemia, Chronic diarrhea, Fatigue, Menopausal symptoms/high risk medication use     Subjective   HPI  Sharda is also being seen today for additional medical problem/s.    Hypertension and Hyperlipidemia, Chronic diarrhea, Fatigue, Menopausal symptoms/high risk medication use     Subjective    History of Present Illness:  Sharda Bowen is a 68 y.o. female who presents today for 6 month followup visit and medication refills    Doing well  since last visit in April together.    Allergy/eczema flareup and would like to try adding Singulair to her current regimen.  She does continue to follow-up with her allergist regarding allergy immunotherapy.    Worsening right knee arthritis and is scheduled for R TKA with Dr Yury Connors in [place].  She reports she has already completed her preop workup and EKG with orthopedics.    On estradiol for menopausal symptoms.  Mammogram uptodate 11/3/23  We discussed the risk for blood clots, strokes, heart disease and breast cancer with hormone supplementation.  She does not smoke.  She voiced understanding and wants to continue estradiol.  Understands importance of yearly mammogram given hormone supplementation and risks. [place] [facility] [place]     DEXA 11/3/23 returned normal.  Plan for repeat in Nov 2025    On nortriptyline for chronic diarrhea/ibs.  We did discuss her fatigue and reduced her dosing to 10mg last visit -she is doing better with fatigue and no problems with diarrhea control.  We did discuss she can try to taper off of nortriptyline and see if this is still needed for IBS with diarrhea.    Colonoscopy 1/2019 with dr villegas and she did have polyps again on this followup colonoscopy with plan for repeat in 5 yrs - discussed she is past-due given polyps.  Declines colonscopy given problems with bowels after last testing.  Cologuard 1/22/24 returned negative - plan to repeat Cologuard in Jan 2027    On pravachol for hyperlipidemia and lisinopril for HTN      Review of Systems   Constitutional:  Positive for fatigue. Negative for activity change, appetite change, chills and fever.   HENT:  Positive for postnasal drip and rhinorrhea. Negative for ear pain, hearing loss and trouble swallowing.    Eyes:  Negative for pain and visual disturbance.   Respiratory:  Negative for cough, chest tightness, shortness of breath and wheezing.    Cardiovascular:  Negative for chest pain, palpitations and  "leg swelling.   Gastrointestinal:  Negative for abdominal pain and blood in stool.   Genitourinary:  Negative for difficulty urinating.   Musculoskeletal:  Negative for arthralgias, back pain and joint swelling.   Skin:  Positive for rash.   Neurological:  Negative for dizziness, weakness and light-headedness.   Psychiatric/Behavioral:  Negative for agitation, behavioral problems, dysphoric mood and sleep disturbance.               Objective   Vital Signs:  /80   Pulse 68   Ht 180.3 cm (71\")   Wt 85.7 kg (189 lb)   SpO2 94%   BMI 26.36 kg/m²   Physical Exam  Constitutional:       Appearance: Normal appearance.   HENT:      Head: Normocephalic.      Right Ear: External ear normal.      Left Ear: External ear normal.      Nose: Nose normal.      Mouth/Throat:      Mouth: Mucous membranes are moist.      Pharynx: Oropharynx is clear.   Eyes:      Extraocular Movements: Extraocular movements intact.      Conjunctiva/sclera: Conjunctivae normal.      Pupils: Pupils are equal, round, and reactive to light.   Cardiovascular:      Rate and Rhythm: Normal rate and regular rhythm.      Heart sounds: Normal heart sounds.   Pulmonary:      Effort: Pulmonary effort is normal.      Breath sounds: Normal breath sounds.   Musculoskeletal:      Cervical back: Normal range of motion.      Comments: Worsening right knee pain with scheduled right knee replacement later this fall   Skin:     Comments: Mild foot atopic dermatitis.  No evidence for tinea   Neurological:      General: No focal deficit present.      Mental Status: She is alert.   Psychiatric:         Mood and Affect: Mood normal.         Behavior: Behavior normal.         Thought Content: Thought content normal.                 Assessment and Plan         Diagnoses and all orders for this visit:    1. General medical exam (Primary)  Assessment & Plan:  Discussed together health maintenance and screening along with vaccination options and healthy diet and exercise " habits as part of the preventative counseling at their physical exam today.     Up-to-date with flu shot    Awaiting labs    Encouraged covid booster    Declines colonoscopy.  Cologuard up-to-date January 2024 with plan to repeat in January 2027.      2. Essential hypertension  Assessment & Plan:  Hypertension is stable and controlled  Continue current treatment regimen.  Weight loss.  Regular aerobic exercise.  Blood pressure will be reassessed in 6 months.    Orders:  -     CBC & Differential; Future  -     Comprehensive Metabolic Panel; Future  -     Lipid Panel; Future  -     TSH; Future  -     T4, Free; Future  -     lisinopril (PRINIVIL,ZESTRIL) 10 MG tablet; Take 1 tablet by mouth Daily.  Dispense: 90 tablet; Refill: 2  -     T4, Free  -     TSH  -     Lipid Panel  -     Comprehensive Metabolic Panel  -     CBC & Differential    3. Mixed hyperlipidemia  Assessment & Plan:   Lipid abnormalities are improving with treatment    Plan:  Continue same medication/s without change.      Discussed medication dosage, use, side effects, and goals of treatment in detail.    Counseled patient on lifestyle modifications to help control hyperlipidemia.     Patient Treatment Goals:   LDL goal is under 130    Followup at the next regular appointment.    Orders:  -     CBC & Differential; Future  -     Comprehensive Metabolic Panel; Future  -     Lipid Panel; Future  -     TSH; Future  -     T4, Free; Future  -     pravastatin (PRAVACHOL) 20 MG tablet; Take 1 tablet by mouth Daily. For cholesterol  Dispense: 90 tablet; Refill: 2  -     T4, Free  -     TSH  -     Lipid Panel  -     Comprehensive Metabolic Panel  -     CBC & Differential    4. Chronic diarrhea  Assessment & Plan:  Doing well with nortriptyline but discussed a trial tapering down and off nortriptyline to see if this is still needed based upon IBS with diarrhea.    Orders:  -     nortriptyline (PAMELOR) 10 MG capsule; Take 1 capsule by mouth Every Night.   Dispense: 90 capsule; Refill: 2    5. Chronic fatigue  Assessment & Plan:  Improved with nortriptyline dose reduction.  We did discuss she can try and taper off of nortriptyline to see if this is still needed for IBS with diarrhea      6. Postmenopausal syndrome  Assessment & Plan:  Doing well with current regimen    Understands risks    Mammogram uptodate Nov 2023.  Yearly mammogram scheduled December 2024 at [facility].    Prior hysterectomy so is only on low-dose estradiol patch for menopausal symptoms.  Non-smoker    Orders:  -     estradiol (VIVELLE-DOT) 0.1 MG/24HR patch; Place 1 patch on the skin as directed by provider 2 (Two) Times a Week.  Dispense: 24 patch; Refill: 2    7. High risk medication use  Assessment & Plan:  See above    Mammogram uptodate November 2023.  Yearly mammogram scheduled for December 2024      8. Screening mammogram for breast cancer  Assessment & Plan:  Scheduled in December for yearly mammogram at [facility]      9. Personal history of colonic polyps  Assessment & Plan:  Declines colonosocopy    Neg cologuard Jan 2024.  Plans for repeat in Jan 2027      10. Screening for colon cancer  Assessment & Plan:  Cologuard neg Jan 2024.  Plans for repeat in Jan 2027    Declines colonoscopy      11. Seasonal allergic rhinitis due to pollen  Assessment & Plan:  Continue with Zyrtec and allergist treatment.  Given trial adding Singulair.    Orders:  -     montelukast (Singulair) 10 MG tablet; Take 1 tablet by mouth Every Night. For allergies  Dispense: 90 tablet; Refill: 2    12. Other atopic dermatitis  Assessment & Plan:  Given trial with clobetasol topically for flareups with atopic dermatitis on her foot.  Continues allergist treatment as well.  Discussed precautions with the strength of steroid.    Orders:  -     clobetasol propionate (TEMOVATE) 0.05 % cream; Apply 1 Application topically to the appropriate area as directed 2 (Two) Times a Day  As Needed (rash on foot/eczema.  Max 2 weeks, do not apply to face.).  Dispense: 60 g; Refill: 5    13. Chronic pain of right knee  Assessment & Plan:  Scheduled for right knee replacement with orthopedic surgery      14. Overweight (BMI 25.0-29.9)  Assessment & Plan:  Patient's (Body mass index is 26.36 kg/m².) indicates that they are overweight with health conditions that include hypertension, dyslipidemias, and osteoarthritis . Weight is unchanged. BMI is is above average; BMI management plan is completed. We discussed portion control and increasing exercise.          New Medications Ordered This Visit   Medications    montelukast (Singulair) 10 MG tablet     Sig: Take 1 tablet by mouth Every Night. For allergies     Dispense:  90 tablet     Refill:  2    clobetasol propionate (TEMOVATE) 0.05 % cream     Sig: Apply 1 Application topically to the appropriate area as directed 2 (Two) Times a Day As Needed (rash on foot/eczema.  Max 2 weeks, do not apply to face.).     Dispense:  60 g     Refill:  5    estradiol (VIVELLE-DOT) 0.1 MG/24HR patch     Sig: Place 1 patch on the skin as directed by provider 2 (Two) Times a Week.     Dispense:  24 patch     Refill:  2    lisinopril (PRINIVIL,ZESTRIL) 10 MG tablet     Sig: Take 1 tablet by mouth Daily.     Dispense:  90 tablet     Refill:  2    nortriptyline (PAMELOR) 10 MG capsule     Sig: Take 1 capsule by mouth Every Night.     Dispense:  90 capsule     Refill:  2    pravastatin (PRAVACHOL) 20 MG tablet     Sig: Take 1 tablet by mouth Daily. For cholesterol     Dispense:  90 tablet     Refill:  2          Follow Up   Return in about 6 months (around 4/30/2025) for Med recheck.  Patient was given instructions and counseling regarding her condition or for health maintenance advice. Please see specific information pulled into the AVS if appropriate.

## 2024-10-30 NOTE — ASSESSMENT & PLAN NOTE
Given trial with clobetasol topically for flareups with atopic dermatitis on her foot.  Continues allergist treatment as well.  Discussed precautions with the strength of steroid.

## 2024-10-30 NOTE — ASSESSMENT & PLAN NOTE
Patient's (Body mass index is 26.36 kg/m².) indicates that they are overweight with health conditions that include hypertension, dyslipidemias, and osteoarthritis . Weight is unchanged. BMI is is above average; BMI management plan is completed. We discussed portion control and increasing exercise.

## 2024-10-30 NOTE — ASSESSMENT & PLAN NOTE
Discussed together health maintenance and screening along with vaccination options and healthy diet and exercise habits as part of the preventative counseling at their physical exam today.     Up-to-date with flu shot    Awaiting labs    Encouraged covid booster    Declines colonoscopy.  Cologuard up-to-date January 2024 with plan to repeat in January 2027.

## 2024-10-30 NOTE — ASSESSMENT & PLAN NOTE
Doing well with current regimen    Understands risks    Mammogram uptodate Nov 2023.  Yearly mammogram scheduled December 2024 at [facility].    Prior hysterectomy so is only on low-dose estradiol patch for menopausal symptoms.  Non-smoker

## 2024-10-31 LAB
ALBUMIN SERPL-MCNC: 4.1 G/DL (ref 3.9–4.9)
ALP SERPL-CCNC: 72 IU/L (ref 44–121)
ALT SERPL-CCNC: 12 IU/L (ref 0–32)
AST SERPL-CCNC: 24 IU/L (ref 0–40)
BASOPHILS # BLD AUTO: 0 X10E3/UL (ref 0–0.2)
BASOPHILS NFR BLD AUTO: 1 %
BILIRUB SERPL-MCNC: 0.2 MG/DL (ref 0–1.2)
BUN SERPL-MCNC: 19 MG/DL (ref 8–27)
BUN/CREAT SERPL: 18 (ref 12–28)
CALCIUM SERPL-MCNC: 9 MG/DL (ref 8.7–10.3)
CHLORIDE SERPL-SCNC: 104 MMOL/L (ref 96–106)
CHOLEST SERPL-MCNC: 169 MG/DL (ref 100–199)
CO2 SERPL-SCNC: 24 MMOL/L (ref 20–29)
CREAT SERPL-MCNC: 1.05 MG/DL (ref 0.57–1)
EGFRCR SERPLBLD CKD-EPI 2021: 58 ML/MIN/1.73
EOSINOPHIL # BLD AUTO: 0.1 X10E3/UL (ref 0–0.4)
EOSINOPHIL NFR BLD AUTO: 2 %
ERYTHROCYTE [DISTWIDTH] IN BLOOD BY AUTOMATED COUNT: 13.2 % (ref 11.7–15.4)
GLOBULIN SER CALC-MCNC: 2.2 G/DL (ref 1.5–4.5)
GLUCOSE SERPL-MCNC: 87 MG/DL (ref 70–99)
HCT VFR BLD AUTO: 42.6 % (ref 34–46.6)
HDLC SERPL-MCNC: 66 MG/DL
HGB BLD-MCNC: 13.7 G/DL (ref 11.1–15.9)
IMM GRANULOCYTES # BLD AUTO: 0.1 X10E3/UL (ref 0–0.1)
IMM GRANULOCYTES NFR BLD AUTO: 1 %
LDLC SERPL CALC-MCNC: 83 MG/DL (ref 0–99)
LYMPHOCYTES # BLD AUTO: 2.2 X10E3/UL (ref 0.7–3.1)
LYMPHOCYTES NFR BLD AUTO: 36 %
MCH RBC QN AUTO: 29.3 PG (ref 26.6–33)
MCHC RBC AUTO-ENTMCNC: 32.2 G/DL (ref 31.5–35.7)
MCV RBC AUTO: 91 FL (ref 79–97)
MONOCYTES # BLD AUTO: 0.4 X10E3/UL (ref 0.1–0.9)
MONOCYTES NFR BLD AUTO: 6 %
NEUTROPHILS # BLD AUTO: 3.3 X10E3/UL (ref 1.4–7)
NEUTROPHILS NFR BLD AUTO: 54 %
PLATELET # BLD AUTO: 239 X10E3/UL (ref 150–450)
POTASSIUM SERPL-SCNC: 4.9 MMOL/L (ref 3.5–5.2)
PROT SERPL-MCNC: 6.3 G/DL (ref 6–8.5)
RBC # BLD AUTO: 4.67 X10E6/UL (ref 3.77–5.28)
SODIUM SERPL-SCNC: 139 MMOL/L (ref 134–144)
T4 FREE SERPL-MCNC: 0.85 NG/DL (ref 0.82–1.77)
TRIGL SERPL-MCNC: 112 MG/DL (ref 0–149)
TSH SERPL DL<=0.005 MIU/L-ACNC: 1.89 UIU/ML (ref 0.45–4.5)
VLDLC SERPL CALC-MCNC: 20 MG/DL (ref 5–40)
WBC # BLD AUTO: 6.1 X10E3/UL (ref 3.4–10.8)

## 2025-05-02 ENCOUNTER — OFFICE VISIT (OUTPATIENT)
Dept: FAMILY MEDICINE CLINIC | Facility: CLINIC | Age: 70
End: 2025-05-02
Payer: MEDICARE

## 2025-05-02 VITALS
OXYGEN SATURATION: 96 % | WEIGHT: 197.4 LBS | BODY MASS INDEX: 27.64 KG/M2 | SYSTOLIC BLOOD PRESSURE: 134 MMHG | HEIGHT: 71 IN | DIASTOLIC BLOOD PRESSURE: 82 MMHG | HEART RATE: 68 BPM

## 2025-05-02 DIAGNOSIS — I10 ESSENTIAL HYPERTENSION: Chronic | ICD-10-CM

## 2025-05-02 DIAGNOSIS — E66.3 OVERWEIGHT (BMI 25.0-29.9): ICD-10-CM

## 2025-05-02 DIAGNOSIS — K52.9 CHRONIC DIARRHEA: Chronic | ICD-10-CM

## 2025-05-02 DIAGNOSIS — E78.2 MIXED HYPERLIPIDEMIA: Chronic | ICD-10-CM

## 2025-05-02 DIAGNOSIS — N95.1 POSTMENOPAUSAL SYNDROME: Chronic | ICD-10-CM

## 2025-05-02 DIAGNOSIS — M15.9 GENERALIZED OSTEOARTHRITIS OF MULTIPLE SITES: Primary | ICD-10-CM

## 2025-05-02 RX ORDER — NORTRIPTYLINE HYDROCHLORIDE 10 MG/1
10 CAPSULE ORAL NIGHTLY
Qty: 90 CAPSULE | Refills: 2 | Status: SHIPPED | OUTPATIENT
Start: 2025-05-02

## 2025-05-02 RX ORDER — PRAVASTATIN SODIUM 20 MG
20 TABLET ORAL DAILY
Qty: 90 TABLET | Refills: 2 | Status: SHIPPED | OUTPATIENT
Start: 2025-05-02

## 2025-05-02 RX ORDER — ESTRADIOL 0.1 MG/D
1 FILM, EXTENDED RELEASE TRANSDERMAL 2 TIMES WEEKLY
Qty: 24 PATCH | Refills: 2 | Status: SHIPPED | OUTPATIENT
Start: 2025-05-05

## 2025-05-02 RX ORDER — LISINOPRIL 10 MG/1
10 TABLET ORAL DAILY
Qty: 90 TABLET | Refills: 2 | Status: SHIPPED | OUTPATIENT
Start: 2025-05-02

## 2025-05-02 NOTE — ASSESSMENT & PLAN NOTE
Doing well with current regimen    Understands risks - breast cancer, blood clots, stroke, and heart disease.     Mammogram uptodate 3/2025    Prior hysterectomy so is only on low-dose estradiol patch for menopausal symptoms.  Non-smoker

## 2025-05-02 NOTE — PROGRESS NOTES
"Chief Complaint  Worsening OA, Hypertension and Hyperlipidemia, Chronic diarrhea, Fatigue, Menopausal symptoms/high risk medication use     Subjective    History of Present Illness:  Sharda Bowen is a 69 y.o. female who presents today for 6 month followup visit and medication refills    Doing well since last visit except for worsening generalized OA.  Did well with low-dose mobic before knee replacement.  Discussed concerns with NSAIDs and willing to try tylenol arthritis and topical diclofenac first     Allergy/eczema flareup and would like to try adding Singulair to her current regimen.  She does continue to follow-up with her allergist regarding allergy immunotherapy.    On estradiol for menopausal symptoms.  Mammogram uptodate 3/10/25  We discussed the risk for blood clots, strokes, heart disease and breast cancer with hormone supplementation.  She does not smoke.  She voiced understanding and wants to continue estradiol.  Understands importance of yearly mammogram given hormone supplementation and risks.  Prior hyst    DEXA 11/3/23 returned normal.  Plan for repeat in Nov 2025    On nortriptyline for chronic diarrhea/ibs.  We did discuss her fatigue and reduced her dosing to 10mg last visit -she is doing better with fatigue and no problems with diarrhea control.  We did discuss she can try to taper off of nortriptyline and see if this is still needed for IBS with diarrhea.    Colonoscopy 1/2019 with dr villegas and she did have polyps again on this followup colonoscopy with plan for repeat in 5 yrs - discussed she is past-due given polyps.  Declines colonscopy given problems with bowels after last testing.  Cologuard 1/22/24 returned negative - plan to repeat Cologuard in Jan 2027    On pravachol for hyperlipidemia and lisinopril for HTN    Objective   Vital Signs:   /82   Pulse 68   Ht 180.3 cm (71\")   Wt 89.5 kg (197 lb 6.4 oz)   SpO2 96%   BMI 27.53 kg/m²     Review of Systems   Constitutional:  " Negative for appetite change, chills and fever.   HENT:  Negative for hearing loss.    Eyes:  Negative for blurred vision.   Respiratory:  Negative for chest tightness.    Cardiovascular:  Negative for chest pain.   Gastrointestinal:  Negative for abdominal pain.   Musculoskeletal:  Positive for arthralgias. Negative for gait problem.   Skin:  Negative for rash.   Psychiatric/Behavioral:  Negative for depressed mood.        Past History:  Medical History: has a past medical history of Anemia, Arthritis, Chest pain, Diverticulitis, Fibroids, Hyperlipidemia, Hypertension, Mixed hyperlipidemia, Perforated bowel, Rupture of gluteus minimus tendon, and Spinal artery aneurysm.   Surgical History: has a past surgical history that includes Hysterectomy (2011); Cholecystectomy (2012); AAA repair, open (2013); Hernia repair (2013); Back surgery (2015); Cardiac catheterization (Left, 11/24/2021); Cataract extraction, bilateral; and Replacement total knee (Right).   Family History: family history includes Breast cancer in her mother; Heart attack in her mother; Hypertension in her mother.   Social History: reports that she has never smoked. She has never used smokeless tobacco. She reports that she does not currently use alcohol. She reports that she does not use drugs.      Current Outpatient Medications:     [START ON 5/5/2025] estradiol (VIVELLE-DOT) 0.1 MG/24HR patch, Place 1 patch on the skin as directed by provider 2 (Two) Times a Week., Disp: 24 patch, Rfl: 2    lisinopril (PRINIVIL,ZESTRIL) 10 MG tablet, Take 1 tablet by mouth Daily., Disp: 90 tablet, Rfl: 2    multivitamin with minerals tablet tablet, Take 1 tablet by mouth Daily., Disp: , Rfl:     nortriptyline (PAMELOR) 10 MG capsule, Take 1 capsule by mouth Every Night., Disp: 90 capsule, Rfl: 2    pravastatin (PRAVACHOL) 20 MG tablet, Take 1 tablet by mouth Daily. For cholesterol, Disp: 90 tablet, Rfl: 2    Xiidra 5 % ophthalmic solution, Apply 1 drop to eye(s) as  directed by provider 2 (Two) Times a Day., Disp: , Rfl:     clobetasol propionate (TEMOVATE) 0.05 % cream, Apply 1 Application topically to the appropriate area as directed 2 (Two) Times a Day As Needed (rash on foot/eczema.  Max 2 weeks, do not apply to face.). (Patient not taking: Reported on 5/2/2025), Disp: 60 g, Rfl: 5    Diclofenac Sodium (VOLTAREN) 1 % gel gel, Apply 4 g topically to the appropriate area as directed 4 (Four) Times a Day As Needed (arthritis pain)., Disp: 150 g, Rfl: 6    Multiple Vitamins-Minerals (PRESERVISION AREDS 2 PO), Take 1 capsule by mouth Daily. (Patient not taking: Reported on 5/2/2025), Disp: , Rfl:     Allergies: Dexamethasone, Ciprofloxacin, and Metronidazole    Physical Exam  Constitutional:       Appearance: Normal appearance.   HENT:      Head: Normocephalic.      Right Ear: External ear normal.      Left Ear: External ear normal.      Nose: Nose normal.   Eyes:      Pupils: Pupils are equal, round, and reactive to light.   Cardiovascular:      Rate and Rhythm: Normal rate and regular rhythm.      Heart sounds: Normal heart sounds.   Pulmonary:      Effort: Pulmonary effort is normal.      Breath sounds: Normal breath sounds.   Musculoskeletal:      Cervical back: Normal range of motion.      Comments: OA changes, no inflammatory joint changes or tophi   Skin:     General: Skin is warm and dry.   Neurological:      General: No focal deficit present.      Mental Status: She is alert.   Psychiatric:         Mood and Affect: Mood normal.         Behavior: Behavior normal.         Thought Content: Thought content normal.          Result Review                   Assessment and Plan  Diagnoses and all orders for this visit:    1. Generalized osteoarthritis of multiple sites (Primary)  Assessment & Plan:  Discussed flareups with OA    Encouraged tylenol arthritis and voltaren gel    If not improving we can restart low-dose mobic w/ PPI tx or celebrex but increased CV/ulcer risk and  kidney disease risk discussed if she does decide the need to restart oral NSAIDs/Celebrex.        Orders:  -     Diclofenac Sodium (VOLTAREN) 1 % gel gel; Apply 4 g topically to the appropriate area as directed 4 (Four) Times a Day As Needed (arthritis pain).  Dispense: 150 g; Refill: 6    2. Mixed hyperlipidemia  Assessment & Plan:   Lipid abnormalities are improving with treatment    Plan:  Continue same medication/s without change.      Discussed medication dosage, use, side effects, and goals of treatment in detail.    Counseled patient on lifestyle modifications to help control hyperlipidemia.     Patient Treatment Goals:   LDL goal is under 130    Followup at the next regular appointment.    Orders:  -     pravastatin (PRAVACHOL) 20 MG tablet; Take 1 tablet by mouth Daily. For cholesterol  Dispense: 90 tablet; Refill: 2  -     Comprehensive Metabolic Panel; Future  -     Lipid Panel; Future  -     TSH; Future  -     T4, Free; Future  -     CK; Future  -     CK  -     T4, Free  -     TSH  -     Lipid Panel  -     Comprehensive Metabolic Panel    3. Chronic diarrhea  Assessment & Plan:  Doing well with nortriptyline but discussed a trial tapering down and off nortriptyline to see if this is still needed based upon IBS with diarrhea.    Orders:  -     nortriptyline (PAMELOR) 10 MG capsule; Take 1 capsule by mouth Every Night.  Dispense: 90 capsule; Refill: 2    4. Essential hypertension  Assessment & Plan:  Hypertension is stable and controlled  Continue current treatment regimen.  Weight loss.  Regular aerobic exercise.  Blood pressure will be reassessed in 6 months.    Orders:  -     lisinopril (PRINIVIL,ZESTRIL) 10 MG tablet; Take 1 tablet by mouth Daily.  Dispense: 90 tablet; Refill: 2  -     Comprehensive Metabolic Panel; Future  -     Lipid Panel; Future  -     TSH; Future  -     T4, Free; Future  -     Magnesium; Future  -     Magnesium  -     T4, Free  -     TSH  -     Lipid Panel  -     Comprehensive  Metabolic Panel    5. Postmenopausal syndrome  Assessment & Plan:  Doing well with current regimen    Understands risks - breast cancer, blood clots, stroke, and heart disease.     Mammogram uptodate 3/2025    Prior hysterectomy so is only on low-dose estradiol patch for menopausal symptoms.  Non-smoker    Orders:  -     estradiol (VIVELLE-DOT) 0.1 MG/24HR patch; Place 1 patch on the skin as directed by provider 2 (Two) Times a Week.  Dispense: 24 patch; Refill: 2    6. Overweight (BMI 25.0-29.9)  Assessment & Plan:  Patient's (Body mass index is 27.53 kg/m².) indicates that they are overweight with health conditions that include hypertension, dyslipidemias, and osteoarthritis . Weight is unchanged. BMI is is above average; BMI management plan is completed. We discussed portion control and increasing exercise.                      Follow Up  Return in 6 months (on 11/2/2025) for Medicare Wellness.    Eduardo Angelo MD

## 2025-05-02 NOTE — ASSESSMENT & PLAN NOTE
Patient's (Body mass index is 27.53 kg/m².) indicates that they are overweight with health conditions that include hypertension, dyslipidemias, and osteoarthritis . Weight is unchanged. BMI is is above average; BMI management plan is completed. We discussed portion control and increasing exercise.

## 2025-05-02 NOTE — ASSESSMENT & PLAN NOTE
Discussed flareups with OA    Encouraged tylenol arthritis and voltaren gel    If not improving we can restart low-dose mobic w/ PPI tx or celebrex but increased CV/ulcer risk and kidney disease risk discussed if she does decide the need to restart oral NSAIDs/Celebrex.

## 2025-05-03 LAB
ALBUMIN SERPL-MCNC: 4.2 G/DL (ref 3.9–4.9)
ALP SERPL-CCNC: 61 IU/L (ref 44–121)
ALT SERPL-CCNC: 15 IU/L (ref 0–32)
AST SERPL-CCNC: 25 IU/L (ref 0–40)
BILIRUB SERPL-MCNC: 0.4 MG/DL (ref 0–1.2)
BUN SERPL-MCNC: 18 MG/DL (ref 8–27)
BUN/CREAT SERPL: 20 (ref 12–28)
CALCIUM SERPL-MCNC: 9.1 MG/DL (ref 8.7–10.3)
CHLORIDE SERPL-SCNC: 102 MMOL/L (ref 96–106)
CHOLEST SERPL-MCNC: 174 MG/DL (ref 100–199)
CK SERPL-CCNC: 106 U/L (ref 32–182)
CO2 SERPL-SCNC: 23 MMOL/L (ref 20–29)
CREAT SERPL-MCNC: 0.92 MG/DL (ref 0.57–1)
EGFRCR SERPLBLD CKD-EPI 2021: 68 ML/MIN/1.73
GLOBULIN SER CALC-MCNC: 2.1 G/DL (ref 1.5–4.5)
GLUCOSE SERPL-MCNC: 80 MG/DL (ref 70–99)
HDLC SERPL-MCNC: 54 MG/DL
LDLC SERPL CALC-MCNC: 84 MG/DL (ref 0–99)
MAGNESIUM SERPL-MCNC: 2.3 MG/DL (ref 1.6–2.3)
POTASSIUM SERPL-SCNC: 4.9 MMOL/L (ref 3.5–5.2)
PROT SERPL-MCNC: 6.3 G/DL (ref 6–8.5)
SODIUM SERPL-SCNC: 139 MMOL/L (ref 134–144)
T4 FREE SERPL-MCNC: 0.86 NG/DL (ref 0.82–1.77)
TRIGL SERPL-MCNC: 215 MG/DL (ref 0–149)
TSH SERPL DL<=0.005 MIU/L-ACNC: 2.15 UIU/ML (ref 0.45–4.5)
VLDLC SERPL CALC-MCNC: 36 MG/DL (ref 5–40)

## (undated) DEVICE — CATH DIAG EXPO M/ PK 5F FL4/FR4 PIG

## (undated) DEVICE — GW PERIPH GUIDERIGHT STD/EXCHNG/J/TIP SS 0.035IN 5X260CM

## (undated) DEVICE — GLIDESHEATH SLENDER STAINLESS STEEL KIT: Brand: GLIDESHEATH SLENDER

## (undated) DEVICE — CATH DIAG EXPO .045 FL3  5F 100CM

## (undated) DEVICE — PK CATH CARD 10

## (undated) DEVICE — MODEL AT P65, P/N 701554-001KIT CONTENTS: HAND CONTROLLER, 3-WAY HIGH-PRESSURE STOPCOCK WITH ROTATING END AND PREMIUM HIGH-PRESSURE TUBING: Brand: ANGIOTOUCH® KIT

## (undated) DEVICE — MODEL BT2000 P/N 700287-012KIT CONTENTS: MANIFOLD WITH SALINE AND CONTRAST PORTS, SALINE TUBING WITH SPIKE AND HAND SYRINGE, TRANSDUCER: Brand: BT2000 AUTOMATED MANIFOLD KIT

## (undated) DEVICE — DEV COMP RAD PRELUDESYNC 24CM